# Patient Record
Sex: MALE
[De-identification: names, ages, dates, MRNs, and addresses within clinical notes are randomized per-mention and may not be internally consistent; named-entity substitution may affect disease eponyms.]

---

## 2018-03-22 ENCOUNTER — HOSPITAL (OUTPATIENT)
Dept: OTHER | Age: 73
End: 2018-03-22
Attending: UROLOGY

## 2018-03-22 LAB — SURG SPECIMEN: NORMAL

## 2018-08-02 PROBLEM — F41.9 ANXIETY: Status: ACTIVE | Noted: 2018-08-02

## 2018-08-02 PROBLEM — M19.90 OSTEOARTHRITIS, UNSPECIFIED OSTEOARTHRITIS TYPE, UNSPECIFIED SITE: Status: ACTIVE | Noted: 2018-08-02

## 2018-08-02 PROCEDURE — 86803 HEPATITIS C AB TEST: CPT | Performed by: INTERNAL MEDICINE

## 2018-08-02 PROCEDURE — 36415 COLL VENOUS BLD VENIPUNCTURE: CPT | Performed by: INTERNAL MEDICINE

## 2019-02-08 PROBLEM — K42.9 UMBILICAL HERNIA WITHOUT OBSTRUCTION AND WITHOUT GANGRENE: Status: ACTIVE | Noted: 2019-02-08

## 2020-06-19 PROBLEM — E78.5 DYSLIPIDEMIA: Status: ACTIVE | Noted: 2020-06-19

## 2020-11-11 ENCOUNTER — HOSPITAL ENCOUNTER (OUTPATIENT)
Age: 75
Discharge: HOME | End: 2020-11-11
Payer: MEDICARE

## 2020-11-11 VITALS
SYSTOLIC BLOOD PRESSURE: 155 MMHG | DIASTOLIC BLOOD PRESSURE: 72 MMHG | TEMPERATURE: 97 F | OXYGEN SATURATION: 98 % | HEART RATE: 64 BPM | RESPIRATION RATE: 20 BRPM

## 2020-11-11 DIAGNOSIS — Z20.822 CLOSE EXPOSURE TO COVID-19 VIRUS: Primary | ICD-10-CM

## 2020-11-11 DIAGNOSIS — J34.89 RHINORRHEA: ICD-10-CM

## 2020-11-11 PROCEDURE — 99213 OFFICE O/P EST LOW 20 MIN: CPT

## 2020-11-11 PROCEDURE — 99203 OFFICE O/P NEW LOW 30 MIN: CPT

## 2020-11-11 NOTE — ED PROVIDER NOTES
Patient Seen in: [facility]      History   Patient presents with:  Testing  Runny Nose    Stated Complaint: [phone] test exposed,cough    HPI    Ezequiel Acosta is a 51-year-old male who presents today with concern for possible COVID 19.   Pas °C) (Temporal)   Resp 20   SpO2 98%         Physical Exam    Nursing note reviewed. Vital signs reviewed. Constitutional: Oriented to person, place, and time. Patient appears well-developed and well-nourished, non-toxic and in no acute distress.  Activel addressed to the patient's satisfaction prior to discharge today.          Disposition and Plan     Clinical Impression:  Close exposure to COVID-19 virus  (primary encounter diagnosis)  Rhinorrhea    Disposition:  Discharge  11/11/2020  3:34 pm    Follow-u

## 2021-03-16 PROBLEM — M47.816 LUMBAR SPONDYLOSIS: Status: ACTIVE | Noted: 2021-03-16

## 2021-03-16 PROBLEM — M43.16 SPONDYLOLISTHESIS AT L4-L5 LEVEL: Status: ACTIVE | Noted: 2021-03-16

## 2021-04-12 DIAGNOSIS — Z23 NEED FOR VACCINATION: ICD-10-CM

## 2025-04-30 ENCOUNTER — LAB REQUISITION (OUTPATIENT)
Dept: LAB | Facility: HOSPITAL | Age: 80
End: 2025-04-30
Payer: MEDICARE

## 2025-04-30 DIAGNOSIS — N17.9 ACUTE KIDNEY FAILURE, UNSPECIFIED: ICD-10-CM

## 2025-04-30 DIAGNOSIS — D62 ACUTE POSTHEMORRHAGIC ANEMIA: ICD-10-CM

## 2025-04-30 LAB
ALBUMIN SERPL-MCNC: 3.3 G/DL (ref 3.2–4.8)
ALBUMIN/GLOB SERPL: 1.7 {RATIO} (ref 1–2)
ALP LIVER SERPL-CCNC: 152 U/L (ref 45–117)
ALT SERPL-CCNC: 10 U/L (ref 10–49)
ANION GAP SERPL CALC-SCNC: 6 MMOL/L (ref 0–18)
AST SERPL-CCNC: 24 U/L (ref ?–34)
BASOPHILS # BLD AUTO: 0.02 X10(3) UL (ref 0–0.2)
BASOPHILS NFR BLD AUTO: 0.2 %
BILIRUB SERPL-MCNC: 2.4 MG/DL (ref 0.2–1.1)
BUN BLD-MCNC: 13 MG/DL (ref 9–23)
CALCIUM BLD-MCNC: 8.5 MG/DL (ref 8.7–10.6)
CHLORIDE SERPL-SCNC: 108 MMOL/L (ref 98–112)
CO2 SERPL-SCNC: 27 MMOL/L (ref 21–32)
CREAT BLD-MCNC: 0.92 MG/DL (ref 0.7–1.3)
DEPRECATED HBV CORE AB SER IA-ACNC: 298 NG/ML (ref 50–336)
EGFRCR SERPLBLD CKD-EPI 2021: 84 ML/MIN/1.73M2 (ref 60–?)
EOSINOPHIL # BLD AUTO: 0.06 X10(3) UL (ref 0–0.7)
EOSINOPHIL NFR BLD AUTO: 0.7 %
ERYTHROCYTE [DISTWIDTH] IN BLOOD BY AUTOMATED COUNT: 17.6 %
GLOBULIN PLAS-MCNC: 2 G/DL (ref 2–3.5)
GLUCOSE BLD-MCNC: 93 MG/DL (ref 70–99)
HCT VFR BLD AUTO: 27.2 % (ref 39–53)
HGB BLD-MCNC: 9 G/DL (ref 13–17.5)
IMM GRANULOCYTES # BLD AUTO: 0.04 X10(3) UL (ref 0–1)
IMM GRANULOCYTES NFR BLD: 0.5 %
IRON SATN MFR SERPL: 20 % (ref 20–50)
IRON SERPL-MCNC: 39 UG/DL (ref 65–175)
LYMPHOCYTES # BLD AUTO: 1.09 X10(3) UL (ref 1–4)
LYMPHOCYTES NFR BLD AUTO: 13.1 %
MCH RBC QN AUTO: 31.6 PG (ref 26–34)
MCHC RBC AUTO-ENTMCNC: 33.1 G/DL (ref 31–37)
MCV RBC AUTO: 95.4 FL (ref 80–100)
MONOCYTES # BLD AUTO: 0.59 X10(3) UL (ref 0.1–1)
MONOCYTES NFR BLD AUTO: 7.1 %
NEUTROPHILS # BLD AUTO: 6.49 X10 (3) UL (ref 1.5–7.7)
NEUTROPHILS # BLD AUTO: 6.49 X10(3) UL (ref 1.5–7.7)
NEUTROPHILS NFR BLD AUTO: 78.4 %
OSMOLALITY SERPL CALC.SUM OF ELEC: 292 MOSM/KG (ref 275–295)
PLATELET # BLD AUTO: 463 10(3)UL (ref 150–450)
POTASSIUM SERPL-SCNC: 3.5 MMOL/L (ref 3.5–5.1)
PROT SERPL-MCNC: 5.3 G/DL (ref 5.7–8.2)
RBC # BLD AUTO: 2.85 X10(6)UL (ref 3.8–5.8)
SODIUM SERPL-SCNC: 141 MMOL/L (ref 136–145)
TOTAL IRON BINDING CAPACITY: 198 UG/DL (ref 250–425)
TRANSFERRIN SERPL-MCNC: 146 MG/DL (ref 215–365)
WBC # BLD AUTO: 8.3 X10(3) UL (ref 4–11)

## 2025-04-30 PROCEDURE — 82728 ASSAY OF FERRITIN: CPT | Performed by: INTERNAL MEDICINE

## 2025-04-30 PROCEDURE — 83540 ASSAY OF IRON: CPT | Performed by: INTERNAL MEDICINE

## 2025-04-30 PROCEDURE — 85025 COMPLETE CBC W/AUTO DIFF WBC: CPT

## 2025-04-30 PROCEDURE — 83550 IRON BINDING TEST: CPT | Performed by: INTERNAL MEDICINE

## 2025-04-30 PROCEDURE — 83550 IRON BINDING TEST: CPT

## 2025-04-30 PROCEDURE — 83540 ASSAY OF IRON: CPT

## 2025-04-30 PROCEDURE — 80053 COMPREHEN METABOLIC PANEL: CPT

## 2025-04-30 PROCEDURE — 80053 COMPREHEN METABOLIC PANEL: CPT | Performed by: INTERNAL MEDICINE

## 2025-04-30 PROCEDURE — 85025 COMPLETE CBC W/AUTO DIFF WBC: CPT | Performed by: INTERNAL MEDICINE

## 2025-04-30 PROCEDURE — 82728 ASSAY OF FERRITIN: CPT

## 2025-05-01 ENCOUNTER — HOSPITAL ENCOUNTER (OUTPATIENT)
Facility: HOSPITAL | Age: 80
Setting detail: OBSERVATION
LOS: 1 days | Discharge: NURSING, REHAB OR LONG-TERM CARE | End: 2025-05-07
Attending: EMERGENCY MEDICINE | Admitting: HOSPITALIST
Payer: MEDICARE

## 2025-05-01 ENCOUNTER — APPOINTMENT (OUTPATIENT)
Dept: GENERAL RADIOLOGY | Facility: HOSPITAL | Age: 80
End: 2025-05-01
Attending: EMERGENCY MEDICINE
Payer: MEDICARE

## 2025-05-01 DIAGNOSIS — R29.6 FALLING: Primary | ICD-10-CM

## 2025-05-01 DIAGNOSIS — F03.C0 SEVERE DEMENTIA WITHOUT BEHAVIORAL DISTURBANCE, PSYCHOTIC DISTURBANCE, MOOD DISTURBANCE, OR ANXIETY, UNSPECIFIED DEMENTIA TYPE (HCC): ICD-10-CM

## 2025-05-01 DIAGNOSIS — S42.401A CLOSED FRACTURE OF RIGHT ELBOW, INITIAL ENCOUNTER: ICD-10-CM

## 2025-05-01 LAB
ALBUMIN SERPL-MCNC: 3.8 G/DL (ref 3.2–4.8)
ALBUMIN/GLOB SERPL: 1.5 {RATIO} (ref 1–2)
ALP LIVER SERPL-CCNC: 194 U/L (ref 45–117)
ALT SERPL-CCNC: 10 U/L (ref 10–49)
ANION GAP SERPL CALC-SCNC: 9 MMOL/L (ref 0–18)
AST SERPL-CCNC: 27 U/L (ref ?–34)
BASOPHILS # BLD AUTO: 0.01 X10(3) UL (ref 0–0.2)
BASOPHILS NFR BLD AUTO: 0.1 %
BILIRUB SERPL-MCNC: 2.9 MG/DL (ref 0.2–1.1)
BUN BLD-MCNC: 13 MG/DL (ref 9–23)
CALCIUM BLD-MCNC: 9.3 MG/DL (ref 8.7–10.6)
CHLORIDE SERPL-SCNC: 106 MMOL/L (ref 98–112)
CO2 SERPL-SCNC: 24 MMOL/L (ref 21–32)
CREAT BLD-MCNC: 0.85 MG/DL (ref 0.7–1.3)
EGFRCR SERPLBLD CKD-EPI 2021: 88 ML/MIN/1.73M2 (ref 60–?)
EOSINOPHIL # BLD AUTO: 0.12 X10(3) UL (ref 0–0.7)
EOSINOPHIL NFR BLD AUTO: 1.2 %
ERYTHROCYTE [DISTWIDTH] IN BLOOD BY AUTOMATED COUNT: 17.4 %
GLOBULIN PLAS-MCNC: 2.5 G/DL (ref 2–3.5)
GLUCOSE BLD-MCNC: 94 MG/DL (ref 70–99)
HCT VFR BLD AUTO: 29.6 % (ref 39–53)
HGB BLD-MCNC: 9.7 G/DL (ref 13–17.5)
IMM GRANULOCYTES # BLD AUTO: 0.06 X10(3) UL (ref 0–1)
IMM GRANULOCYTES NFR BLD: 0.6 %
LYMPHOCYTES # BLD AUTO: 1.44 X10(3) UL (ref 1–4)
LYMPHOCYTES NFR BLD AUTO: 14.3 %
MCH RBC QN AUTO: 31.2 PG (ref 26–34)
MCHC RBC AUTO-ENTMCNC: 32.8 G/DL (ref 31–37)
MCV RBC AUTO: 95.2 FL (ref 80–100)
MONOCYTES # BLD AUTO: 0.83 X10(3) UL (ref 0.1–1)
MONOCYTES NFR BLD AUTO: 8.2 %
NEUTROPHILS # BLD AUTO: 7.62 X10 (3) UL (ref 1.5–7.7)
NEUTROPHILS # BLD AUTO: 7.62 X10(3) UL (ref 1.5–7.7)
NEUTROPHILS NFR BLD AUTO: 75.6 %
OSMOLALITY SERPL CALC.SUM OF ELEC: 288 MOSM/KG (ref 275–295)
PLATELET # BLD AUTO: 472 10(3)UL (ref 150–450)
POTASSIUM SERPL-SCNC: 3.3 MMOL/L (ref 3.5–5.1)
PROT SERPL-MCNC: 6.3 G/DL (ref 5.7–8.2)
RBC # BLD AUTO: 3.11 X10(6)UL (ref 3.8–5.8)
SODIUM SERPL-SCNC: 139 MMOL/L (ref 136–145)
WBC # BLD AUTO: 10.1 X10(3) UL (ref 4–11)

## 2025-05-01 PROCEDURE — 71045 X-RAY EXAM CHEST 1 VIEW: CPT | Performed by: EMERGENCY MEDICINE

## 2025-05-01 PROCEDURE — 73070 X-RAY EXAM OF ELBOW: CPT | Performed by: EMERGENCY MEDICINE

## 2025-05-01 PROCEDURE — 72190 X-RAY EXAM OF PELVIS: CPT | Performed by: EMERGENCY MEDICINE

## 2025-05-01 RX ORDER — ACETAMINOPHEN 500 MG
500 TABLET ORAL EVERY 4 HOURS PRN
Status: DISCONTINUED | OUTPATIENT
Start: 2025-05-01 | End: 2025-05-07

## 2025-05-01 RX ORDER — MELOXICAM 15 MG/1
15 TABLET ORAL DAILY
COMMUNITY
Start: 2024-07-02 | End: 2025-05-07

## 2025-05-01 RX ORDER — HYDROCODONE BITARTRATE AND ACETAMINOPHEN 5; 325 MG/1; MG/1
1 TABLET ORAL EVERY 4 HOURS PRN
COMMUNITY
Start: 2025-04-30

## 2025-05-01 RX ORDER — FOLIC ACID 1 MG/1
1 TABLET ORAL DAILY
COMMUNITY

## 2025-05-01 RX ORDER — LORAZEPAM 2 MG/ML
0.5 INJECTION INTRAMUSCULAR EVERY 4 HOURS PRN
Status: DISCONTINUED | OUTPATIENT
Start: 2025-05-01 | End: 2025-05-07

## 2025-05-01 RX ORDER — ASPIRIN 81 MG/1
81 TABLET, CHEWABLE ORAL 2 TIMES DAILY
COMMUNITY
Start: 2025-04-22 | End: 2025-05-13

## 2025-05-01 RX ORDER — DONEPEZIL HYDROCHLORIDE 10 MG/1
10 TABLET, FILM COATED ORAL NIGHTLY
Status: DISCONTINUED | OUTPATIENT
Start: 2025-05-01 | End: 2025-05-05

## 2025-05-01 RX ORDER — HYDROCODONE BITARTRATE AND ACETAMINOPHEN 5; 325 MG/1; MG/1
1 TABLET ORAL ONCE
Refills: 0 | Status: COMPLETED | OUTPATIENT
Start: 2025-05-01 | End: 2025-05-01

## 2025-05-01 RX ORDER — LORAZEPAM 2 MG/ML
0.5 INJECTION INTRAMUSCULAR ONCE
Status: COMPLETED | OUTPATIENT
Start: 2025-05-01 | End: 2025-05-01

## 2025-05-01 RX ORDER — HEPARIN SODIUM 5000 [USP'U]/ML
5000 INJECTION, SOLUTION INTRAVENOUS; SUBCUTANEOUS EVERY 8 HOURS SCHEDULED
Status: DISCONTINUED | OUTPATIENT
Start: 2025-05-01 | End: 2025-05-02

## 2025-05-01 RX ORDER — MELATONIN
100 DAILY
Status: DISCONTINUED | OUTPATIENT
Start: 2025-05-02 | End: 2025-05-04

## 2025-05-01 RX ORDER — ATORVASTATIN CALCIUM 10 MG/1
10 TABLET, FILM COATED ORAL NIGHTLY
Status: DISCONTINUED | OUTPATIENT
Start: 2025-05-02 | End: 2025-05-07

## 2025-05-01 RX ORDER — HYDROCODONE BITARTRATE AND ACETAMINOPHEN 5; 325 MG/1; MG/1
1 TABLET ORAL EVERY 6 HOURS PRN
Status: DISCONTINUED | OUTPATIENT
Start: 2025-05-01 | End: 2025-05-03

## 2025-05-01 RX ORDER — DONEPEZIL HYDROCHLORIDE 5 MG/1
5 TABLET, FILM COATED ORAL NIGHTLY
Status: DISCONTINUED | OUTPATIENT
Start: 2025-05-01 | End: 2025-05-01

## 2025-05-01 RX ORDER — DONEPEZIL HYDROCHLORIDE 10 MG/1
10 TABLET, FILM COATED ORAL NIGHTLY
COMMUNITY
Start: 2025-03-03 | End: 2025-05-07

## 2025-05-01 RX ORDER — ONDANSETRON 2 MG/ML
4 INJECTION INTRAMUSCULAR; INTRAVENOUS EVERY 4 HOURS PRN
Status: ACTIVE | OUTPATIENT
Start: 2025-05-01 | End: 2025-05-01

## 2025-05-01 RX ORDER — SODIUM CHLORIDE 9 MG/ML
INJECTION, SOLUTION INTRAVENOUS ONCE
Status: COMPLETED | OUTPATIENT
Start: 2025-05-01 | End: 2025-05-03

## 2025-05-01 RX ORDER — ACETAMINOPHEN 500 MG
500 TABLET ORAL ONCE
Status: COMPLETED | OUTPATIENT
Start: 2025-05-01 | End: 2025-05-01

## 2025-05-01 RX ORDER — HYDROMORPHONE HYDROCHLORIDE 1 MG/ML
0.5 INJECTION, SOLUTION INTRAMUSCULAR; INTRAVENOUS; SUBCUTANEOUS EVERY 30 MIN PRN
Status: ACTIVE | OUTPATIENT
Start: 2025-05-01 | End: 2025-05-01

## 2025-05-01 NOTE — ED PROVIDER NOTES
Patient Seen in: [facility] Emergency Department      History     Chief Complaint   Patient presents with    Fall     Stated Complaint: Multiple falls, last fall 2 days ago    Subjective:     HPI    80-year-old male who has hypertension and dementia and experienced a fall on 2025, while visiting his cousin in [place] for a family event. Due to his dementia, he was unaware of the stairs in the hallway, leading to a significant fall. As a result, he sustained multiple injuries, including fractures in his pelvis, back, right elbow, and two ribs. He was hospitalized in [place], where he required two blood transfusions due to internal bleeding associated with the pelvic fracture. Despite these injuries, he attempted to walk. Two days ago, he fell again from a standing position, claiming the kitchen sink moved, but did not seek hospital care after this incident. Since the recent fall, he has been experiencing persistent pain, particularly in his lower back, and has been mostly bedridden, using a bedpan for bathroom needs. His family has been in contact with his doctor to arrange for home rehabilitation.    Objective:   Past Medical History:    Anxiety    Back problem    Dementia (HCC)    High blood pressure    Muscle weakness    Osteoarthritis              Past Surgical History:   Procedure Laterality Date    Cataract  ?    R eye    Colonoscopy  2018    colon polyps    Fracture surgery      Hip replacement surgery      Knee replacement surgery                  Social History     Socioeconomic History    Marital status:     Number of children: 3   Occupational History    Occupation: Retired  40 yrs   Tobacco Use    Smoking status: Former     Current packs/day: 0.00     Types: Cigarettes     Quit date: 2002     Years since quittin.7    Smokeless tobacco: Never   Vaping Use    Vaping status: Never Used   Substance and Sexual Activity    Alcohol use: Yes     Alcohol/week: 12.0  standard drinks of alcohol     Types: 12 Cans of beer per week     Comment: Social    Drug use: Yes     Types: Cannabis     Comment: daily    Sexual activity: Yes     Partners: Female   Other Topics Concern     Service No    Blood Transfusions No    Exercise Yes    Seat Belt Yes     Social Drivers of Health     Food Insecurity: No Food Insecurity (5/1/2025)    NCSS - Food Insecurity     Worried About Running Out of Food in the Last Year: No     Ran Out of Food in the Last Year: No   Transportation Needs: Unmet Transportation Needs (5/1/2025)    NCSS - Transportation     Lack of Transportation: Yes   Housing Stability: Not At Risk (5/1/2025)    NCSS - Housing/Utilities     Has Housing: Yes     Worried About Losing Housing: No     Unable to Get Utilities: No              Review of Systems    Positive for stated complaint: Multiple falls, last fall 2 days ago  Other systems are as noted in HPI.  Constitutional and vital signs reviewed.      All other systems reviewed and negative except as noted above.    Physical Exam     ED Triage Vitals [05/01/25 1528]   BP (!) 163/60   Pulse 74   Resp 16   Temp 98.4 °F (36.9 °C)   Temp src Oral   SpO2 97 %   O2 Device None (Room air)       Current:/74 (BP Location: Left arm)   Pulse 66   Temp 97.3 °F (36.3 °C) (Oral)   Resp 18   Ht 175.3 cm (5' 9\")   Wt 69.9 kg   SpO2 98%   BMI 22.76 kg/m²       General:  Vitals as listed.  No acute distress  Head: Atraumatic  HEENT: Sclerae anicteric.  Conjunctivae show no pallor.  Oropharynx clear, mucous membranes moist  Neck: Nontender  Lungs: good air exchange and clear   Heart: regular rate rhythm and no murmur  Back: Nontender  Chest wall: Mild right mid lateral rib tenderness  Abdomen: Soft and nontender.  No abdominal masses.  No peritoneal signs  Pelvis: Stable and nontender to rocking  Extremities: Right elbow splinted.  No significant swelling of the right forearm.  There is a bruise on the left proximal medial  forearm.  Neuro: Alert, chronically disoriented and nonfocal      ED Course     Labs Reviewed   COMP METABOLIC PANEL (14) - Abnormal; Notable for the following components:       Result Value    Potassium 3.3 (*)     Alkaline Phosphatase 194 (*)     Bilirubin, Total 2.9 (*)     All other components within normal limits   CBC WITH DIFFERENTIAL WITH PLATELET - Abnormal; Notable for the following components:    RBC 3.11 (*)     HGB 9.7 (*)     HCT 29.6 (*)     .0 (*)     All other components within normal limits   URINALYSIS WITH CULTURE REFLEX     XR ELBOW, (2 VIEWS), RIGHT (CPT=73070)  Result Date: 5/1/2025  CONCLUSION:  1. Postsurgical changes with plate and screw fixation of the proximal ulna. 2. There is an avulsion fracture fragment along the dorsal aspect of the distal humerus.  This may be chronic but correlation region of pain is recommended and if prior radiographs are available, comparison would be beneficial. 3. Joint effusion. 4. Osteoarthritic changes.    LOCATION:  [facility]   Dictated by (CST): Angelito Guido MD on 5/01/2025 at 7:23 PM     Finalized by (CST): Angelito Guido MD on 5/01/2025 at 7:26 PM       XR CHEST AP PORTABLE  (CPT=71045)  Result Date: 5/1/2025  CONCLUSION:  1. Mildly displaced posterior 7th and 8th rib fractures. 2. Mild interstitial opacities may represent chronic interstitial changes versus mild edema.    LOCATION:  [facility]      Dictated by (CST): Angelito Guido MD on 5/01/2025 at 7:20 PM     Finalized by (CST): Angelito Guido MD on 5/01/2025 at 7:21 PM       XR PELVIS (COMPLETE MIN 3 VIEWS) (CPT=72190)  Result Date: 5/1/2025  CONCLUSION:  There are multiple large orthopedic screws noted scattered in the pelvis.  There is age-indeterminate fracture deformities of the superior pubic rami adjacent to screws.  Comparison with prior exams would be helpful to assess for interval change.    LOCATION:  [id number]   Dictated by (CST): Ibrahima Starr MD on 5/01/2025 at 7:16 PM      Finalized by (CST): Ibrahima Starr MD on 5/01/2025 at 7:18 PM       ED COURSE and MDM     Sources of the medical history included the patient and multiple family members.    Differential diagnosis before testing included stable orthopedic ORIF versus disruption of his pelvis ORIF causing massive hemorrhage, a medical condition that poses a threat to life.    I reviewed prior external notes including records from [facility] as recorded below.     saw patient in the Emergency Department and recommends admission for SNF placement.  She will order OT/PT consultation.     Case discussed with the [facility] hospitalist.    It appears the patient has no new bony injuries.  This is based on the following information in Blue font :    Studies that patient’s granddaughter shared from virgilio from [facility]    XR Right Elbow: Acute, distracted and mildly comminuted olecranon process fracture.    CT PELVIS WO IV CONTRAST 4/18/2025 5:46 AM  Urinary bladder: Bladder wall thickening. Gas within bladder. Infection or bladder injury can not be excluded.  Bones/joints: Postoperative changes of the pelvis.  Status post percutaneous  fixation of the sacroiliac joints with 2 long screws traversing both SI joints.  IlluminOss device within the right ilium. Improved alignment of the right iliac  wing fracture. Decreased displacement of the right iliac wing fracture.  Nondisplaced left iliac wing fracture. Nondisplaced right sacral fracture. Mild widening of the right SI joint, however widening has improved compared to prior  exam. Left sacral ala fracture. Cortex is mildly depressed. Percutaneous  fixation of the bilateral superior rami fractures with 2 long screws transfixing the superior pubic rami fractures.  Improved alignment compared to  prior exam. Minimally displaced bilateral inferior pubic rami fractures,  alignment is unchanged. Bilateral hip arthroplasties. Associated artifact from  hardware.  Soft tissues: Diffuse soft tissue swelling and subcutaneous gas, presumably  postoperative. Evidence of hemorrhage within the pelvis bilaterally. Hemorrhage  along the iliopsoas muscles bilaterally, extending to the space of read CS.  There is also focal swelling of the gluteal musculature bilaterally, likely related to edema and hemorrhage.    Impression  Constellation of findings is compatible with prior lateral compression type  injury, status post percutaneous fixation of bilateral sacral fractures/ Sl  joints, and bilateral superior pubic rami fractures.  Right ilium IlluminOss device. Improved alignment of right SI joint with slight persistent widening  anteriorly. Improved alignment of the pelvic fractures. Pelvic fractures  involve the bilateral iliac wings, sacrum, bilateral superior inferior pubic rami.      XR Pelvis: 4/21/2025 8:30 PM  FINDINGS:  Bones/joints: Stable postoperative changes following fixation of sacral and  pelvic fractures. The hardware is stable from the postoperative CT scan of  04/18/2025. There are 2 screws spanning the sacroiliac joints. There are large  FINDINGS:  Bones/joints: Stable postoperative changes following fixation of sacral and  pelvic fractures. The hardware is stable from the postoperative CT scan of  04/18/2025. There are 2 screws spanning the sacroitiac joints. There are large  curve screws which extend from the lateral supra-acetabular regions of the iliac bones across the superior ischial/pubic rami, terminating near the pubic symphysis. The hardware appears to be in good position and stable. No significant deformity is seen following open reduction internal fixation. Nobnew fractures identified. There is an old healed right inferior pubic ramus fracture. There is marked osteopenia. Bilateral hip prostheses are partially visualized and appear to be in good position.  Soft tissues: Unremarkable.  Vasculature: Vascular calcifications are noted.    Patient  given Ativan for mild agitation while waiting for hospital bed.    I have discussed with the patient the results of testing, differential diagnosis, and treatment plan. They expressed clear understanding of these instructions and agrees to the plan provided.    Disposition and Plan     Clinical Impression:  1. Falling    2. Severe dementia without behavioral disturbance, psychotic disturbance, mood disturbance, or anxiety, unspecified dementia type (HCC)    3. Closed fracture of right elbow, initial encounter         Disposition:  Admit  5/1/2025  6:35 pm    Follow-up:  No follow-up provider specified.      Medications Prescribed:  Current Discharge Medication List

## 2025-05-01 NOTE — CM/SW NOTE
CM met with patient at [facility] and family in [facility] patient was in [place] had a fall and was hospitalized with many fractures. When patient returned home patient continues to have pain, weakness and another fall. Family requesting rehab to increase patient strength and endurance and return home with home health following rehab placement.    Patient lives with wife in a one level house. Wife states \"I cannot keep picking him up after all these falls.\"    Resources given including respite care, caregiver resources and a place for mom    Discussed with MD, jeffrey braswell for PT OT and will submit for insurance auth for rehab placement    Judy BAILEY, CCM, MSN    Emergency Room  [facility]  Clinical Transitions Leader  [phone]

## 2025-05-01 NOTE — ED INITIAL ASSESSMENT (HPI)
Presents to ED for mid lower back pain, increased agitation. Family endorses multiple falls the past couple days, two days ago had an unwitnessed fall unknown LOC, unknown head trauma. Recently had surgery on pelvis, right elbow on the 18th and 19th. Hx dementia. Takes daily baby aspirin.

## 2025-05-02 ENCOUNTER — APPOINTMENT (OUTPATIENT)
Dept: GENERAL RADIOLOGY | Facility: HOSPITAL | Age: 80
End: 2025-05-02
Attending: INTERNAL MEDICINE
Payer: MEDICARE

## 2025-05-02 LAB
ALBUMIN SERPL-MCNC: 3.5 G/DL (ref 3.2–4.8)
ALP LIVER SERPL-CCNC: 184 U/L (ref 45–117)
ALT SERPL-CCNC: 8 U/L (ref 10–49)
ANION GAP SERPL CALC-SCNC: 5 MMOL/L (ref 0–18)
AST SERPL-CCNC: 23 U/L (ref ?–34)
BILIRUB DIRECT SERPL-MCNC: 0.8 MG/DL (ref ?–0.3)
BILIRUB DIRECT SERPL-MCNC: 0.9 MG/DL (ref ?–0.3)
BILIRUB SERPL-MCNC: 2.6 MG/DL (ref 0.2–1.1)
BUN BLD-MCNC: 13 MG/DL (ref 9–23)
CALCIUM BLD-MCNC: 8.2 MG/DL (ref 8.7–10.6)
CHLORIDE SERPL-SCNC: 110 MMOL/L (ref 98–112)
CO2 SERPL-SCNC: 25 MMOL/L (ref 21–32)
CREAT BLD-MCNC: 0.84 MG/DL (ref 0.7–1.3)
EGFRCR SERPLBLD CKD-EPI 2021: 88 ML/MIN/1.73M2 (ref 60–?)
ERYTHROCYTE [DISTWIDTH] IN BLOOD BY AUTOMATED COUNT: 17.2 %
GLUCOSE BLD-MCNC: 98 MG/DL (ref 70–99)
HCT VFR BLD AUTO: 24.4 % (ref 39–53)
HGB BLD-MCNC: 8.1 G/DL (ref 13–17.5)
MAGNESIUM SERPL-MCNC: 1.9 MG/DL (ref 1.6–2.6)
MCH RBC QN AUTO: 31.2 PG (ref 26–34)
MCHC RBC AUTO-ENTMCNC: 33.2 G/DL (ref 31–37)
MCV RBC AUTO: 93.8 FL (ref 80–100)
OSMOLALITY SERPL CALC.SUM OF ELEC: 290 MOSM/KG (ref 275–295)
PLATELET # BLD AUTO: 412 10(3)UL (ref 150–450)
POTASSIUM SERPL-SCNC: 3.6 MMOL/L (ref 3.5–5.1)
POTASSIUM SERPL-SCNC: 3.6 MMOL/L (ref 3.5–5.1)
POTASSIUM SERPL-SCNC: 4.9 MMOL/L (ref 3.5–5.1)
PROT SERPL-MCNC: 5.7 G/DL (ref 5.7–8.2)
RBC # BLD AUTO: 2.6 X10(6)UL (ref 3.8–5.8)
SODIUM SERPL-SCNC: 140 MMOL/L (ref 136–145)
WBC # BLD AUTO: 6.1 X10(3) UL (ref 4–11)

## 2025-05-02 PROCEDURE — 72100 X-RAY EXAM L-S SPINE 2/3 VWS: CPT | Performed by: INTERNAL MEDICINE

## 2025-05-02 RX ORDER — FOLIC ACID 1 MG/1
1 TABLET ORAL DAILY
Status: DISCONTINUED | OUTPATIENT
Start: 2025-05-02 | End: 2025-05-07

## 2025-05-02 RX ORDER — TROLAMINE SALICYLATE 10 G/100G
CREAM TOPICAL 3 TIMES DAILY PRN
Status: DISCONTINUED | OUTPATIENT
Start: 2025-05-02 | End: 2025-05-07

## 2025-05-02 RX ORDER — ASPIRIN 81 MG/1
81 TABLET, CHEWABLE ORAL DAILY
Status: DISCONTINUED | OUTPATIENT
Start: 2025-05-03 | End: 2025-05-02

## 2025-05-02 RX ORDER — POTASSIUM CHLORIDE 1.5 G/1.58G
40 POWDER, FOR SOLUTION ORAL EVERY 4 HOURS
Status: COMPLETED | OUTPATIENT
Start: 2025-05-02 | End: 2025-05-02

## 2025-05-02 RX ORDER — LOSARTAN POTASSIUM 25 MG/1
25 TABLET ORAL DAILY
Status: DISCONTINUED | OUTPATIENT
Start: 2025-05-02 | End: 2025-05-07

## 2025-05-02 NOTE — ED QUICK NOTES
Orders for admission, patient is aware of plan and ready to go upstairs. Any questions, please call ED RN Jose Manuel at extension [phone].     Patient Covid vaccination status: Fully vaccinated     COVID Test Ordered in ED: None    COVID Suspicion at Admission: N/A    Running Infusions: Medication Infusions[1] None    Mental Status/LOC at time of transport: A&O x 4    Other pertinent information:   CIWA score: N/A   NIH score:  N/A             [1]

## 2025-05-02 NOTE — CM/SW NOTE
This is a Code 44. Patient failed inpatient criteria. Second level of review completed and supports observation.  UR committee in agreement. Discussed with Dr. Lewis by UR RN Jennifer who approves observation status.  Observation order noted. CURRY given to the patient and signed copy placed in their chart.         Lori SHEIKH RN, 05/02/25, 1:06 PM

## 2025-05-02 NOTE — PLAN OF CARE
NURSING ADMISSION NOTE      Patient admitted via Cart  Oriented to room.  Safety precautions initiated.  Bed in low position.  Call light in reach.    Assumed care at 2100  AVS completed by this RN  Pt drowsy/asleep, able to follow simple commands when awake, A&Ox 1-2  Family at bedside  Room air  NSR on tele  Regular diet  Pills whole with water  Cardiac electrolyte replacement- replaced K  Incontinent at times  Denies pain  Max assist- recent fall, bilateral hip surgery  Pt and family updated on plan of care, call light within reach, bed alarm on, bed lowered and locked, needs met at this time, will continue with plan of care      Problem: NEUROLOGICAL - ADULT  Goal: Achieves stable or improved neurological status  Description: INTERVENTIONS- Assess for and report changes in neurological status- Initiate measures to prevent increased intracranial pressure- Maintain blood pressure and fluid volume within ordered parameters to optimize cerebral perfusion and minimize risk of hemorrhage- Monitor temperature, glucose, and sodium. Initiate appropriate interventions as ordered  Outcome: Progressing     Problem: PAIN - ADULT  Goal: Verbalizes/displays adequate comfort level or patient's stated pain goal  Description: INTERVENTIONS:- Encourage pt to monitor pain and request assistance- Assess pain using appropriate pain scale- Administer analgesics based on type and severity of pain and evaluate response- Implement non-pharmacological measures as appropriate and evaluate response- Consider cultural and social influences on pain and pain management- Manage/alleviate anxiety- Utilize distraction and/or relaxation techniques- Monitor for opioid side effects- Notify MD/LIP if interventions unsuccessful or patient reports new pain- Anticipate increased pain with activity and pre-medicate as appropriate  Outcome: Progressing     Problem: SAFETY ADULT - FALL  Goal: Free from fall injury  Description: INTERVENTIONS:- Assess  pt frequently for physical needs- Identify cognitive and physical deficits and behaviors that affect risk of falls.- [place] fall precautions as indicated by assessment.- Educate pt/family on patient safety including physical limitations- Instruct pt to call for assistance with activity based on assessment- Modify environment to reduce risk of injury- Provide assistive devices as appropriate- Consider OT/PT consult to assist with strengthening/mobility- Encourage toileting schedule  Outcome: Progressing

## 2025-05-02 NOTE — PHYSICAL THERAPY NOTE
2 PT orders received, chart reviewed.  Per EMR, pt with recent. Bilateral pubic rami fractures, bilateral posterior medial iliac bone fractures, left sacral ala fracture, right SI joint diastases. Underwent percutaneous fixation of the right sacral and iliac fracture, left sacrum fracture, and a bilateral superior ramus fracture at OSH in [place].  Pt with fall down stairs while visiting family.  Pt with Xray ordered this date, will await results and any further recs for updated activity/weight bearing.  Noted pt to be WBAT of BLEs and NWB of RUE after initial injury/surgery.

## 2025-05-02 NOTE — CM/SW NOTE
05/02/25 1500   CM/SW Referral Data   Referral Source Nurse;Social Work (self-referral)   Reason for Referral Discharge planning  (SDOH)   Informant Spouse/Significant Other;EMR   Medical Hx   Does patient have an established PCP? Yes   Patient Info   Patient's Current Mental Status at Time of Assessment Alert   Patient's Home Environment House   Patient lives with Spouse/Significant other   Patient Status Prior to Admission   Independent with ADLs and Mobility No   Pt. requires assistance with Housework;Driving;Meals;Bathing;Ambulating;Dressing;Medications;Feeding;Toileting;Finances   Discharge Needs   Anticipated D/C needs To be determined   Choice of Post-Acute Provider   Informed patient of right to choose their preferred provider Yes     SW received order for SDOH. Pt is a 79 y/o male admitted with falling. Per EDCM note, family is requesting rehab. PT eval pending. SW met with pt and pt's spouse at bedside to discuss discharge planning.     Pt's spouse stated she is the main caregiver for pt. Pt's spouse stated pt needs assistance with ADLs. Pt's spouse stated pt has a walker and wheelchair, and uses the wheelchair more due to the falls.   Discussed therapy will work with pt to determine appropriate level of care for pt at discharge. SW informed pt's spouse insurance authorization will be needed prior to discharge. SW informed pt's spouse if therapy does not anticipate pt would benefit from gradual rehabilitative therapy, it is unlikely insurance will approve rehab. Pt's spouse requested [facility] services if pt does not qualify for rehab. SW informed pt's spouse SW will f/u with her regarding discharge plan.     Referral for rehab sent in AIDIN for potential rehab need at discharge. SW will need to attach therapy notes to referral once available.   HHC referral will need to be sent if pt does not have need for rehab. PASRR and insurance authorization needed if pt discharges to rehab.    Transportation resources  placed on AVS. SW will continue to follow.     RENETTA Cain  Discharge Planner

## 2025-05-02 NOTE — DIETARY NOTE
[facility]   part of [facility]    NUTRITION ASSESSMENT    Unable to diagnose malnutrition criteria at this time.    NUTRITION INTERVENTION:    RD nutrition Care Plan- See RD nutrition assessment for additional recommendations  Meal and Snacks - Monitor and encourage adequate PO intake.   Medical Food Supplements - Ensure Plus High Protein BID. Rationale/use for oral supplements discussed.    PATIENT STATUS: 05/02/25 80 year old male admitted with fall/ agitation. A/O 1-2. Pt sleeping upon visit this am. RD screened due to MST. Unable to assess wt loss. RD adding ONS BID to max intakes. Follow for po, ons, wts.     ANTHROPOMETRICS:  Ht: 175.3 cm (5' 9\")  Wt: 69.9 kg (154 lb 1.6 oz).   BMI: Body mass index is 22.76 kg/m².  IBW: 73 kg      WEIGHT HISTORY:   Wt Readings from Last 10 Encounters:   05/01/25 69.9 kg (154 lb 1.6 oz)   01/11/22 78 kg (172 lb)   12/28/21 75.3 kg (166 lb)   12/21/21 75.3 kg (166 lb)   11/29/21 75.3 kg (166 lb)   10/04/21 77.1 kg (170 lb)   09/24/21 76.7 kg (169 lb)   04/10/21 76.7 kg (169 lb)   03/26/21 74.4 kg (164 lb)   06/19/20 74.4 kg (164 lb)      NUTRITION:  Diet:       Procedures    Regular/General diet Is Patient on Accuchecks? No      Food Allergies: No  Cultural/Ethnic/Mandaeism Preferences Addressed: Yes    Percent Meals Eaten (last 3 days)       None          GI system review: WNL Last BM Date:  (CHELSEY)  Skin and wounds: WNL    NUTRITION RELATED PHYSICAL FINDINGS:     1. Body Fat/Muscle Mass: mild depletion body fat Buccal fat pad and Midaxillary line and mild muscle depletion Temple region     2. Fluid Accumulation: none    NUTRITION PRESCRIPTION:  70 kg Actual Body Weight  Calories: 4308-7781 calories/day (25-30 kcal/kg)  Protein:  grams protein/day (1.2-1.5 grams protein per kg)  Fluid: ~1 ml/kcal or per MD discretion    NUTRITION DIAGNOSIS/PROBLEM:  Inadequate oral intake related to physiological causes as evidenced by documented/reported insufficient oral  intake    MONITOR AND EVALUATE/NUTRITION GOALS:  PO intake of 75% of meals TID - New  PO intake of 75% of oral nutrition supplement/s - New  Weight stable within 1 to 2 lbs during admission - New    MEDICATIONS:  Reviewed    LABS:  Reviewed    Pt is at Moderate nutrition risk    Charlene Whitney RD, LDN  Clinical Dietitian

## 2025-05-02 NOTE — PLAN OF CARE
Patient alert and oriented x4.  On RA.  NSR on yuliana.  HR 80s.  L shoulder pain.  PRN norco.  XR lumbar spine completed.  PT/OT to see.  Educated on IS use.  Resting comfortably in bed.  Wife at bedside.

## 2025-05-02 NOTE — DISCHARGE INSTRUCTIONS
-[employer]ide [employer] [place] - [phone] [email or web address]  -Ride [place] [phone] or [phone] [email or web address]  -First Transit [phone] [email or web address]    -[place] Ride Around [facility] [phone]  -[facility] Transit System [phone]  -[facility] Transportation [phone] Ext. [phone]   -[facility] Dial-a-Ride Service  Reservations: [phone]  -[facility] [place] Shuttle Bus Line at [phone]  -[facility] Senior Bus Service [phone]  -[facility] [phone].  -[facility] Transit [phone]-[phone]  -[facility] [phone]   -[employer] Transportation [phone]  -[employer] Grandparent Transportation [phone] [email or web address]  -[employer] Transport [phone]  -Disabled on the Go [phone]  -[employer] Transfer [phone]  -[facility] Wheelchair Transport [phone]   -[employer]: [phone]  -[facility] mobile [facility] [phone]  -[employer] [phone]  -[employer] [facility] [place], [facility] [place], and [facility]. [phone] [email or web address]  -[employer] Transportation [facility] and surrounding communities [phone] [email or web address]

## 2025-05-02 NOTE — OCCUPATIONAL THERAPY NOTE
OT order received, chart reviewed. Patient admitted s/p recent hospitalization in [place] after a fall down a flight of stairs while visiting family. Patient then had another recent fall and was brought to ER .  Per EMR, pt with recent. Bilateral pubic rami fractures, bilateral posterior medial iliac bone fractures, left sacral ala fracture, right SI joint diastases. Underwent percutaneous fixation of the right sacral and iliac fracture, left sacrum fracture, and a bilateral superior ramus fracture. Patient with Xray ordered this date, will await results and any further recs for updated activity/weight bearing.  Noted pt to be WBAT of BLEs and NWB of RUE after initial injury/surgery.   Will follow to initiate OT evaluation when appropriate.

## 2025-05-02 NOTE — H&P
[facility]   part of [facility]    History & Physical    Greg Majano Jr. Patient Status:  Inpatient    1945 MRN [id number]   Location [facility] 3NE-A Attending Beck Lewis,    Hosp Day # 1 PCP Les Bella MD     Date:  2025  Date of Admission:  2025    Chief Complaint:     Chief Complaint   Patient presents with    Fall       HPI:   Greg Majano Jr. is a(n) 80 year old male Dementia, eHTN, HLD who fell down the stairs in [place], was admitted to OS, found to have right olecranon fracture, right rib fractures, bilateral unstable pelvic fractures w/ extraperitoneal hemorrhage, compression fractures at T4, T6 thought to be chronic, was seen by trauma and ortho, had ORIF of his right olecranon and pelvis. He had delirium, and required 1u pRBCs, he was taken back to his home in [place] by family who now presents w/  falls, inability to ambulate.     Patient w/ wife at bedside. He reports he is feeling with his hands. Denies pain, when laying in bed, but reports he hasn't attempted ambulation yet. His wife is able to provide more history as he cannot recall any details of his falls. She reports that he fell down stairs at his family's house in [place]. Since coming back home he has been fairly immobile, and po intake has been down, he then will get up to get out of bed bc he is impulsive and didn't wait for help. So he went into the kitchen and fell there too. He has no headahces, or neck pains. He has reported midline low back pain to his wife but denies any now.     History   Past Medical History[1]  Past Surgical History[2]  Family History[3]  Social History:  Short Social Hx on File[4]    Allergies/Medications:   Allergies: Allergies[5]  Prescriptions Prior to Admission[6]    Review of Systems:   A comprehensive 12 point review of systems was otherwise negative, aside from what's stated above.    Physical Exam:   Vital Signs:  Blood pressure (!) 163/62, pulse 69,  temperature 98.5 °F (36.9 °C), temperature source Oral, resp. rate 18, height 5' 9\" (1.753 m), weight 154 lb 1.6 oz (69.9 kg), SpO2 96%.     General: No acute distress. Alert and oriented x 3.  HEENT: Moist mucous membranes. EOM-I. PERRL  Neck: No lymphadenopathy.  No JVD. No carotid bruits.  Respiratory: CTAB, no wheezing   Cardiovascular: RRR, no murmurs   Abdomen: Soft, nontender, nondistended.  Positive bowel sounds. No rebound tenderness  Neurologic: No focal neurological deficits.  Musculoskeletal: right arm is in a cast   Integument: No lesions. No erythema.  Psychiatric: Appropriate mood and affect.    Results:     Lab Results   Component Value Date    WBC 6.1 05/02/2025    HGB 8.1 (L) 05/02/2025    HCT 24.4 (L) 05/02/2025    .0 05/02/2025    CREATSERUM 0.84 05/02/2025    BUN 13 05/02/2025     05/02/2025    K 3.6 05/02/2025    K 3.6 05/02/2025     05/02/2025    CO2 25.0 05/02/2025    GLU 98 05/02/2025    CA 8.2 (L) 05/02/2025    ALB 3.8 05/01/2025    ALKPHO 194 (H) 05/01/2025    BILT 2.9 (H) 05/01/2025    TP 6.3 05/01/2025    AST 27 05/01/2025    ALT 10 05/01/2025    T4F 1.30 11/29/2021    TSH 3.340 11/29/2021    PSA 6.40 (H) 02/08/2019    ESRML 9 06/19/2020    CRP 0.10 06/19/2020    MG 1.9 05/02/2025    B12 287 11/29/2021            XR ELBOW, (2 VIEWS), RIGHT (CPT=73070)  Result Date: 5/1/2025  CONCLUSION:  1. Postsurgical changes with plate and screw fixation of the proximal ulna. 2. There is an avulsion fracture fragment along the dorsal aspect of the distal humerus.  This may be chronic but correlation region of pain is recommended and if prior radiographs are available, comparison would be beneficial. 3. Joint effusion. 4. Osteoarthritic changes.    LOCATION:  [facility]   Dictated by (CST): Angelito Guido MD on 5/01/2025 at 7:23 PM     Finalized by (CST): Angelito Guido MD on 5/01/2025 at 7:26 PM       XR CHEST AP PORTABLE  (CPT=71045)  Result Date: 5/1/2025  CONCLUSION:  1. Mildly  displaced posterior 7th and 8th rib fractures. 2. Mild interstitial opacities may represent chronic interstitial changes versus mild edema.    LOCATION:  [facility]      Dictated by (CST): Angelito Guido MD on 5/01/2025 at 7:20 PM     Finalized by (CST): Angelito Guido MD on 5/01/2025 at 7:21 PM       XR PELVIS (COMPLETE MIN 3 VIEWS) (CPT=72190)  Result Date: 5/1/2025  CONCLUSION:  There are multiple large orthopedic screws noted scattered in the pelvis.  There is age-indeterminate fracture deformities of the superior pubic rami adjacent to screws.  Comparison with prior exams would be helpful to assess for interval change.    LOCATION:  [id number]   Dictated by (CST): Ibrahima Starr MD on 5/01/2025 at 7:16 PM     Finalized by (CST): Ibrahima Starr MD on 5/01/2025 at 7:18 PM             Assessment/Plan:     Greg Majano Jr. is a(n) 80 year old male Dementia, eHTN, HLD who fell down the stairs in [place], was admitted to OSH, found to have right olecranon fracture, right rib fractures, bilateral unstable pelvic fractures w/ extraperitoneal hemorrhage, compression fractures at T4, T6 thought to be chronic, was seen by trauma and ortho, had ORIF of his right olecranon and pelvis. He had delirium, and required 1u pRBCs, he was taken back to his home in [place] by family who now presents w/  falls, inability to ambulate.     [facility] fall   S/p bilateral unstable pelvic fractures s/p ORIF at OSH in [place]  Right olecranon fracture s/p ORIF   H/o compression fractures at T4, T6  Low back pains  Right rib fractures   Pulmonary contusion  - patient w/ repeated falls, likely 2/2 dementia, physical deconditioning, impulsiveness, etc   - w/ reports of low back pain, will obtain plain films low back, he had MRI T and L spine at OSH, and had compression fractures, nsgy did not rec surgical intervention   - pain control w/ norco prn   - consult pt/ot   - op ortho follow up     Minimally elevated bilirubin  - check t bili   - trend      Dementia   - cont home therapies   - b1 daily   - folate     eHTN  HLD  - lipitor   - resume home losartan     Anemia 2/2 abla from fractures and extraperitoneal bleed  - improved from prior, now down trending   - trend cbc daily   - hold aspirin, heparin for now     DVT ppx: see above   Code Status: full    Dispo: pt/ot consult, will likely require DAMIEN vs AIR     Beck Lewis, DO  [facility] Hospitalist                 [1]   Past Medical History:   Anxiety    Back problem    Dementia (HCC)    High blood pressure    Muscle weakness    Osteoarthritis   [2]   Past Surgical History:  Procedure Laterality Date    Cataract  ?    R eye    Colonoscopy  2018    colon polyps    Fracture surgery      Hip replacement surgery      Knee replacement surgery     [3]   Family History  Problem Relation Age of Onset    Cancer Daughter     Cancer Sister     Cancer Niece     Cancer Niece/Nephew    [4]   Social History  Socioeconomic History    Marital status:     Number of children: 3   Occupational History    Occupation: Retired  40 yrs   Tobacco Use    Smoking status: Former     Current packs/day: 0.00     Types: Cigarettes     Quit date: 2002     Years since quittin.7    Smokeless tobacco: Never   Vaping Use    Vaping status: Never Used   Substance and Sexual Activity    Alcohol use: Yes     Alcohol/week: 12.0 standard drinks of alcohol     Types: 12 Cans of beer per week     Comment: Social    Drug use: Yes     Types: Cannabis     Comment: daily    Sexual activity: Yes     Partners: Female   Other Topics Concern     Service No    Blood Transfusions No    Exercise Yes    Seat Belt Yes     Social Drivers of Health     Food Insecurity: No Food Insecurity (2025)    NCSS - Food Insecurity     Worried About Running Out of Food in the Last Year: No     Ran Out of Food in the Last Year: No   Transportation Needs: Unmet Transportation Needs (2025)    NCSS - Transportation     Lack of  Transportation: Yes   Housing Stability: Not At Risk (5/1/2025)    NCSS - Housing/Utilities     Has Housing: Yes     Worried About Losing Housing: No     Unable to Get Utilities: No   [5] No Known Allergies  [6]   Medications Prior to Admission   Medication Sig    aspirin 81 MG Oral Chew Tab Chew 1 tablet (81 mg total) by mouth 2 (two) times daily.    HYDROcodone-acetaminophen 5-325 MG Oral Tab Take 1 tablet by mouth every 4 (four) hours as needed for Pain. FOR PAIN    Meloxicam 15 MG Oral Tab Take 1 tablet (15 mg total) by mouth daily.    folic acid 1 MG Oral Tab Take 1 tablet (1 mg total) by mouth in the morning.    donepezil 10 MG Oral Tab Take 1 tablet (10 mg total) by mouth nightly.    thiamine 100 MG Oral Tab Take 1 tablet (100 mg total) by mouth daily.    LOSARTAN 25 MG Oral Tab TAKE 1 TABLET BY MOUTH EVERY DAY    atorvastatin 10 MG Oral Tab Take 1 tablet (10 mg total) by mouth daily.    ALPRAZolam 0.5 MG Oral Tab Take 1 tablet (0.5 mg total) by mouth daily. (Patient taking differently: Take 1 tablet (0.5 mg total) by mouth nightly as needed for Anxiety.)    Sildenafil Citrate (VIAGRA) 50 MG Oral Tab One po 30 min to 60 minutes prior to intercourse

## 2025-05-02 NOTE — ED QUICK NOTES
Pt received from previous RN, vitals rechecked and recorded. Pain medication given per family and pt's request.

## 2025-05-03 ENCOUNTER — APPOINTMENT (OUTPATIENT)
Dept: GENERAL RADIOLOGY | Facility: HOSPITAL | Age: 80
End: 2025-05-03
Attending: INTERNAL MEDICINE
Payer: MEDICARE

## 2025-05-03 LAB
ALBUMIN SERPL-MCNC: 3.7 G/DL (ref 3.2–4.8)
ALP LIVER SERPL-CCNC: 233 U/L (ref 45–117)
ALT SERPL-CCNC: 8 U/L (ref 10–49)
ANION GAP SERPL CALC-SCNC: 10 MMOL/L (ref 0–18)
AST SERPL-CCNC: 24 U/L (ref ?–34)
ATRIAL RATE: 69 BPM
BASOPHILS # BLD AUTO: 0.01 X10(3) UL (ref 0–0.2)
BASOPHILS NFR BLD AUTO: 0.1 %
BILIRUB DIRECT SERPL-MCNC: 0.9 MG/DL (ref ?–0.3)
BILIRUB SERPL-MCNC: 2.6 MG/DL (ref 0.2–1.1)
BUN BLD-MCNC: 10 MG/DL (ref 9–23)
CALCIUM BLD-MCNC: 8.8 MG/DL (ref 8.7–10.6)
CHLORIDE SERPL-SCNC: 105 MMOL/L (ref 98–112)
CO2 SERPL-SCNC: 22 MMOL/L (ref 21–32)
CREAT BLD-MCNC: 0.81 MG/DL (ref 0.7–1.3)
EGFRCR SERPLBLD CKD-EPI 2021: 89 ML/MIN/1.73M2 (ref 60–?)
EOSINOPHIL # BLD AUTO: 0.04 X10(3) UL (ref 0–0.7)
EOSINOPHIL NFR BLD AUTO: 0.6 %
ERYTHROCYTE [DISTWIDTH] IN BLOOD BY AUTOMATED COUNT: 17 %
GLUCOSE BLD-MCNC: 83 MG/DL (ref 70–99)
HCT VFR BLD AUTO: 27.1 % (ref 39–53)
HGB BLD-MCNC: 9 G/DL (ref 13–17.5)
IMM GRANULOCYTES # BLD AUTO: 0.03 X10(3) UL (ref 0–1)
IMM GRANULOCYTES NFR BLD: 0.4 %
LYMPHOCYTES # BLD AUTO: 1.2 X10(3) UL (ref 1–4)
LYMPHOCYTES NFR BLD AUTO: 17.9 %
MCH RBC QN AUTO: 31.6 PG (ref 26–34)
MCHC RBC AUTO-ENTMCNC: 33.2 G/DL (ref 31–37)
MCV RBC AUTO: 95.1 FL (ref 80–100)
MONOCYTES # BLD AUTO: 0.71 X10(3) UL (ref 0.1–1)
MONOCYTES NFR BLD AUTO: 10.6 %
NEUTROPHILS # BLD AUTO: 4.71 X10 (3) UL (ref 1.5–7.7)
NEUTROPHILS # BLD AUTO: 4.71 X10(3) UL (ref 1.5–7.7)
NEUTROPHILS NFR BLD AUTO: 70.4 %
OSMOLALITY SERPL CALC.SUM OF ELEC: 282 MOSM/KG (ref 275–295)
P AXIS: 36 DEGREES
P-R INTERVAL: 130 MS
PLATELET # BLD AUTO: 445 10(3)UL (ref 150–450)
POTASSIUM SERPL-SCNC: 4.1 MMOL/L (ref 3.5–5.1)
PROT SERPL-MCNC: 6.2 G/DL (ref 5.7–8.2)
Q-T INTERVAL: 430 MS
QRS DURATION: 130 MS
QTC CALCULATION (BEZET): 460 MS
R AXIS: 8 DEGREES
RBC # BLD AUTO: 2.85 X10(6)UL (ref 3.8–5.8)
SODIUM SERPL-SCNC: 137 MMOL/L (ref 136–145)
T AXIS: 95 DEGREES
VENTRICULAR RATE: 69 BPM
WBC # BLD AUTO: 6.7 X10(3) UL (ref 4–11)

## 2025-05-03 PROCEDURE — 73030 X-RAY EXAM OF SHOULDER: CPT | Performed by: INTERNAL MEDICINE

## 2025-05-03 RX ORDER — HYDROCODONE BITARTRATE AND ACETAMINOPHEN 5; 325 MG/1; MG/1
1-2 TABLET ORAL EVERY 6 HOURS PRN
Refills: 0 | Status: DISCONTINUED | OUTPATIENT
Start: 2025-05-03 | End: 2025-05-03

## 2025-05-03 RX ORDER — HYDROCODONE BITARTRATE AND ACETAMINOPHEN 5; 325 MG/1; MG/1
2 TABLET ORAL EVERY 4 HOURS PRN
Refills: 0 | Status: DISCONTINUED | OUTPATIENT
Start: 2025-05-03 | End: 2025-05-07

## 2025-05-03 RX ORDER — HYDROCODONE BITARTRATE AND ACETAMINOPHEN 5; 325 MG/1; MG/1
1 TABLET ORAL EVERY 4 HOURS PRN
Refills: 0 | Status: DISCONTINUED | OUTPATIENT
Start: 2025-05-03 | End: 2025-05-07

## 2025-05-03 RX ORDER — QUETIAPINE FUMARATE 25 MG/1
25 TABLET, FILM COATED ORAL NIGHTLY
Status: DISCONTINUED | OUTPATIENT
Start: 2025-05-03 | End: 2025-05-07

## 2025-05-03 RX ORDER — SODIUM CHLORIDE, SODIUM LACTATE, POTASSIUM CHLORIDE, CALCIUM CHLORIDE 600; 310; 30; 20 MG/100ML; MG/100ML; MG/100ML; MG/100ML
INJECTION, SOLUTION INTRAVENOUS CONTINUOUS
Status: DISCONTINUED | OUTPATIENT
Start: 2025-05-03 | End: 2025-05-04

## 2025-05-03 RX ORDER — ENOXAPARIN SODIUM 100 MG/ML
40 INJECTION SUBCUTANEOUS NIGHTLY
Status: DISCONTINUED | OUTPATIENT
Start: 2025-05-03 | End: 2025-05-07

## 2025-05-03 NOTE — PROGRESS NOTES
[facility] Hospitalist Progress Note     CC: Hospital Follow up - falls    PCP: Les Bella MD       Subjective:     Agitated delirium overnight, wanted to go home, was in pain from his arm, re direction failed, so he was restrained and given ativan   More calm this am but ao to self   Had issues w /this at osh and tx w/ seroquel     OBJECTIVE:    Blood pressure 151/73, pulse 59, temperature 97.9 °F (36.6 °C), temperature source Oral, resp. rate 18, height 5' 9\" (1.753 m), weight 154 lb 1.6 oz (69.9 kg), SpO2 96%.    Temp:  [97.5 °F (36.4 °C)-98.2 °F (36.8 °C)] 97.9 °F (36.6 °C)  Pulse:  [59-82] 59  Resp:  [17-18] 18  BP: (151-168)/(63-73) 151/73  SpO2:  [96 %-98 %] 96 %      Intake/Output:  No intake or output data in the 24 hours ending 05/03/25 1002    Last 3 Weights   05/01/25 2112 154 lb 1.6 oz (69.9 kg)   05/01/25 2107 154 lb (69.9 kg)   05/01/25 1528 145 lb (65.8 kg)   01/11/22 1152 172 lb (78 kg)   12/28/21 1144 166 lb (75.3 kg)       Exam   Gen: Alert, no acute distress  Heent: Normocephalic, atraumatic, neck supple, EOMI, PERRLA  Pulm: Lungs CTAB, normal respiratory effort  CV:  Regular rate and rhythm, no murmurs/rubs/gallops  Abd: Soft, nontender, nondistended, bowel sounds present  Extremities: No peripheral edema, no clubbing, pulses intact   Skin: No rashes or lesions  Neuro: Aox1, right arm in sling, moving all 4   Psych: appropriate mood and affect      Data Review:       Labs:     Recent Labs   Lab 04/30/25  1322 05/01/25  1842 05/02/25  0647   RBC 2.85* 3.11* 2.60*   HGB 9.0* 9.7* 8.1*   HCT 27.2* 29.6* 24.4*   MCV 95.4 95.2 93.8   MCH 31.6 31.2 31.2   MCHC 33.1 32.8 33.2   RDW 17.6 17.4 17.2   NEPRELIM 6.49 7.62  --    WBC 8.3 10.1 6.1   .0* 472.0* 412.0         Recent Labs   Lab 04/30/25  1322 05/01/25  1842 05/02/25  0647 05/02/25  1604   GLU 93 94 98  --    BUN 13 13 13  --    CREATSERUM 0.92 0.85 0.84  --    EGFRCR 84 88 88  --    CA 8.5* 9.3 8.2*  --     139  140  --    K 3.5 3.3* 3.6  3.6 4.9    106 110  --    CO2 27.0 24.0 25.0  --        Recent Labs   Lab 04/30/25  1322 05/01/25  1842 05/02/25  0844   ALT 10 10 8*   AST 24 27 23   ALB 3.3 3.8 3.5         Imaging:  XR LUMBAR SPINE (MIN 2 VIEWS) (CPT=72100)  Result Date: 5/2/2025  CONCLUSION:   1.  Grade 1 anterolisthesis of L4 on L5 secondary to moderate facet joint degenerative change.   2. Mild degenerative disc disease at L5-S1.   LOCATION:  [facility]   Dictated by (CST): Carlos Collins MD on 5/02/2025 at 10:36 AM     Finalized by (CST): Carlos Collins MD on 5/02/2025 at 10:37 AM       XR ELBOW, (2 VIEWS), RIGHT (CPT=73070)  Result Date: 5/1/2025  CONCLUSION:  1. Postsurgical changes with plate and screw fixation of the proximal ulna. 2. There is an avulsion fracture fragment along the dorsal aspect of the distal humerus.  This may be chronic but correlation region of pain is recommended and if prior radiographs are available, comparison would be beneficial. 3. Joint effusion. 4. Osteoarthritic changes.    LOCATION:  [facility]   Dictated by (CST): Angelito Guido MD on 5/01/2025 at 7:23 PM     Finalized by (CST): Angelito Guido MD on 5/01/2025 at 7:26 PM       XR CHEST AP PORTABLE  (CPT=71045)  Result Date: 5/1/2025  CONCLUSION:  1. Mildly displaced posterior 7th and 8th rib fractures. 2. Mild interstitial opacities may represent chronic interstitial changes versus mild edema.    LOCATION:  [facility]      Dictated by (CST): Angelito Guido MD on 5/01/2025 at 7:20 PM     Finalized by (CST): Angelito Guido MD on 5/01/2025 at 7:21 PM       XR PELVIS (COMPLETE MIN 3 VIEWS) (CPT=72190)  Result Date: 5/1/2025  CONCLUSION:  There are multiple large orthopedic screws noted scattered in the pelvis.  There is age-indeterminate fracture deformities of the superior pubic rami adjacent to screws.  Comparison with prior exams would be helpful to assess for interval change.    LOCATION:  [id number]   Dictated by (CST): Ro  MD Ibrahima on 5/01/2025 at 7:16 PM     Finalized by (CST): Ibrahima Starr MD on 5/01/2025 at 7:18 PM           Meds:     Scheduled Medications[1]  Medication Infusions[2]  PRN Medications[3]       Assessment/Plan:           Greg Majano Jr. is a(n) 80 year old male Dementia, eHTN, HLD who fell down the stairs in [place], was admitted to OSH, found to have right olecranon fracture, right rib fractures, bilateral unstable pelvic fractures w/ extraperitoneal hemorrhage, compression fractures at T4, T6 thought to be chronic, was seen by trauma and ortho, had ORIF of his right olecranon and pelvis. He had delirium, and required 1u pRBCs, he was taken back to his home in [place] by family who now presents w/  falls, inability to ambulate.      [facility] fall   S/p bilateral unstable pelvic fractures s/p ORIF at OSH in [place]  Right olecranon fracture s/p ORIF   H/o compression fractures at T4, T6  Low back pains  Right rib fractures   Pulmonary contusion  - patient w/ repeated falls, likely 2/2 dementia, physical deconditioning, impulsiveness, etc   - lumbar spine XR w/o fracture, prior T spine fractures eval by nsgy at OSH, no plans for surgery vs kypho, ortho at OSH rec WBAT for b/l LE and NWB of RUE     - pain control w/ norco prn   - consult pt/ot   - op ortho follow up      Minimally elevated bilirubin  - check t bili   - trend      Dementia   Agitated delirium   - cont home therapies   - b1 daily   - folate   - seroquel nightly   - check EKG to check qtc      eHTN  HLD  - lipitor   - resume home losartan      Anemia 2/2 abla from fractures and extraperitoneal bleed  - improved from prior, now down trending   - trend cbc daily   - hold aspirin, heparin for now     Dispo: pending labs this am and placement, given posey overnight will need to be restraint free x 24 hrs prior to dc     Beck Lewis DO      Supplementary Documentation:   DVT Mechanical Prophylaxis:   SCDs,    DVT Pharmacologic Prophylaxis   Medication    None      DVT Pharmacologic prophylaxis: Aspirin 162 mg         Code Status: Full Code  Rosa: No urinary catheter in place  Rosa Duration (in days):   Central line:    ELIAS:         **Certification      PHYSICIAN Certification of Need for Inpatient Hospitalization - Initial Certification    Patient will require inpatient services that will reasonably be expected to span two midnight's based on the clinical documentation in H+P.   Based on patients current state of illness, I anticipate that, after discharge, patient will require TBD.                                  [1]    folic acid  1 mg Oral Daily    losartan  25 mg Oral Daily    lidocaine-menthol  1 patch Transdermal Daily    atorvastatin  10 mg Oral Nightly    thiamine  100 mg Oral Daily    donepezil  10 mg Oral Nightly   [2] [3]   trolamine salicyliate    LORazepam    HYDROcodone-acetaminophen    acetaminophen

## 2025-05-03 NOTE — PHYSICAL THERAPY NOTE
PHYSICAL THERAPY EVALUATION - INPATIENT     Room Number: 3617/3617-A  Evaluation Date: 5/3/2025  Type of Evaluation: Initial  Physician Order: PT Eval and Treat    Presenting Problem: Fall  Co-Morbidities : Dementia, anxiety, frequent falls  pt with recent. Bilateral pubic rami fractures, bilateral posterior medial iliac bone fractures, left sacral ala fracture, right SI joint diastases. Underwent percutaneous fixation of the right sacral and iliac fracture, left sacrum fracture, and a bilateral superior ramus fracture  Reason for Therapy: Mobility Dysfunction and Discharge Planning    PHYSICAL THERAPY ASSESSMENT   Patient is a 80 year old male admitted 5/1/2025 for fall, weakness, multiple rib fractures.  Prior to admission, patient's baseline is prior to fall in April had been supervision assist for gait w/ rw and w/ adl's.  Patient is currently functioning below baseline with transfers, gait, and standing prolonged periods.  Patient is requiring minimal assist and moderate assist as a result of the following impairments: decreased functional strength, pain, impaired sitting/standing balance, decreased muscular endurance, cognitive deficits (dementia), and medical status.  Physical Therapy will continue to follow for duration of hospitalization.    Patient will benefit from continued skilled PT Services to promote return to prior level of function and safety with continuous assistance and gradual rehabilitative therapy .    PLAN DURING HOSPITALIZATION  Nursing Mobility Recommendation : 1 Assist  PT Device Recommendation: Wheelchair, LBQC  PT Treatment Plan: Bed mobility, Patient education, Family education, Gait training, Strengthening, Transfer training, Balance training  Rehab Potential : Good  Frequency (Obs): 3-5x/week     CURRENT GOALS    Goal #1 Patient is able to demonstrate supine - sit EOB @ level: modified independent     Goal #2 Patient is able to demonstrate transfers EOB to/from [facility] at assistance  level: supervision     Goal #3 Patient is able to ambulate 100 feet with assist device: cane - quad at assistance level: CGA     Goal #4 Work w/ patient on w/c mobiltiy as well.   Goal #5    Goal #6    Goal Comments: Goals established on 5/3/2025      PHYSICAL THERAPY MEDICAL/SOCIAL HISTORY  History related to current admission: Patient is a 80 year old male admitted on 5/1/2025 from home for a fall.  Pt diagnosed with recurrent fall after recent fall down stairs in [place] in April.  Pt w/ unstable pelvic fx s/p ORIF in [place], ORIF R olecranon as well.  Pt w/ thoracic and rib fractures w/ back pain.    HOME SITUATION  Type of Home: House  Home Layout: One level, Ramped entrance  Stairs to Enter : 0        Stairs to Bedroom: 0         Lives With: Spouse    Drives: No   Patient Regularly Uses: Rolling walker, Wheelchair      Prior Level of [place]: Prior to fall in [place], pt was supervision assist for adl's and gait w/ rw.    SUBJECTIVE  \"My back doesn't hurt, it's my arm!\"  Pt rubbing L arm repeatedly.    OBJECTIVE  Precautions: Bed/chair alarm, Limb alert - right  Fall Risk: High fall risk    WEIGHT BEARING RESTRICTION  R Upper Extremity: Non-Weight Bearing  R Lower Extremity: Weight Bearing as Tolerated  L Lower Extremity: Weight Bearing as Tolerated    PAIN ASSESSMENT  Rating: Unable to rate  Location: L ue, uncomfortable in unsupported sitting, rubbing arm, writhing in pain.  Better once in chair  Management Techniques: Repositioning    COGNITION  Overall Cognitive Status:  Impaired  Orientation Level:  oriented to person, disoriented to place, disoriented to time, and disoriented to situation  Memory:  impaired working memory  Following Commands:  follows one step commands without difficulty  Safety Judgement:  decreased awareness of need for assistance and decreased awareness of need for safety  Awareness of Deficits:  decreased awareness of deficits  Problem Solving:  assistance required to identify errors  made, assistance required to generate solutions, and assistance required to implement solutions    RANGE OF MOTION AND STRENGTH ASSESSMENT  Upper extremity ROM and strength -see OT evaluation  See note below as well    Lower extremity ROM is within functional limits     Lower extremity strength is within functional limits - grossly 4-/5    BALANCE  Static Sitting: Fair  Dynamic Sitting: Fair -  Static Standing: Poor +  Dynamic Standing: Poor +    ADDITIONAL TESTS                                    ACTIVITY TOLERANCE                         O2 WALK  Oxygen Therapy  SPO2% on Room Air at Rest: 95    NEUROLOGICAL FINDINGS                        AM-PAC '6-Clicks' INPATIENT SHORT FORM - BASIC MOBILITY  How much difficulty does the patient currently have...  Patient Difficulty: Turning over in bed (including adjusting bedclothes, sheets and blankets)?: None   Patient Difficulty: Sitting down on and standing up from a chair with arms (e.g., wheelchair, bedside commode, etc.): A Little   Patient Difficulty: Moving from lying on back to sitting on the side of the bed?: A Little   How much help from another person does the patient currently need...   Help from Another: Moving to and from a bed to a chair (including a wheelchair)?: A Little   Help from Another: Need to walk in hospital room?: A Little   Help from Another: Climbing 3-5 steps with a railing?: A Lot     AM-PAC Score:  Raw Score: 18   Approx Degree of Impairment: 46.58%   Standardized Score (AM-PAC Scale): 43.63   CMS Modifier (G-Code): CK    FUNCTIONAL ABILITY STATUS  Gait Assessment   Functional Mobility/Gait Assessment  Gait Assistance: Contact guard assist  Distance (ft): 3  Assistive Device: None  Pattern: Shuffle    Skilled Therapy Provided     Bed Mobility:  Rolling: Mod I to his Right  Supine to sit: Supervision assist, w/ CGA at the end b/c pt is slightly impulsive and appeared that he was going to move into standing quickly. Denied dizziness, but did have  severe L ue pain.  Pt sat on eob for at least 10 min due to equipment and line issues.  Pt shifting weight repeatedly in sitting due to pain.  Could not articulate exactly where his arm hurt.  Sit to supine: NT     Transfer Mobility:  Sit to stand: Completed w/ min a of 1, denied dizziness.   Stand to sit: CGA  Gait = Pt completed gait 3'x1 w/ CGA of 2 to bedside chair.  Did not pursue farther gait because pt is nwb and rw was only available. Pt also w/ high level of pain.  Recommend [place] walker or LBQC next session to progress gait.    Therapist's Comments: No observable swelling or redness noted in L ue, but pt is clearly having pain throughout his L ue - rubbing it and cannot sit still due to the pain.  Rn was informed of high level of pain and therapy suggested that pt may require additional imaging.  Pt's L ue supported in chair w/ pillow and pt appeared much more comfortable at end of session.    Exercise/Education Provided:  Bed mobility  Functional activity tolerated  Gait training  Transfer training    Patient End of Session: Up in chair, Needs met, Call light within reach, RN aware of session/findings, All patient questions and concerns addressed, Restraints, Alarm set      Patient Evaluation Complexity Level:  History Moderate - 1 or 2 personal factors and/or co-morbidities   Examination of body systems Moderate - addressing a total of 3 or more elements   Clinical Presentation  Moderate - Evolving   Clinical Decision Making Moderate Complexity       PT Session Time: 25 minutes  Gait Trainin minutes  Therapeutic Activity: 12 minutes  Neuromuscular Re-education: 0 minutes  Therapeutic Exercise: 0 minutes

## 2025-05-03 NOTE — PLAN OF CARE
Received pt in his room alert and coherent. Sinus in his tele.  Left shoulder pain, norco given per mar. Ativan for anxiety provided prn. Unsteady, SBA to the bathroom. Wife on board. Awaiting for insurance to DAMIEN. PT/OT to see. Bed in lowest position, bed alarm on. Safety and fall prec maintained. Will cont poc.      Problem: NEUROLOGICAL - ADULT  Goal: Achieves stable or improved neurological status  Description: INTERVENTIONS- Assess for and report changes in neurological status- Initiate measures to prevent increased intracranial pressure- Maintain blood pressure and fluid volume within ordered parameters to optimize cerebral perfusion and minimize risk of hemorrhage- Monitor temperature, glucose, and sodium. Initiate appropriate interventions as ordered  Outcome: Progressing     Problem: PAIN - ADULT  Goal: Verbalizes/displays adequate comfort level or patient's stated pain goal  Description: INTERVENTIONS:- Encourage pt to monitor pain and request assistance- Assess pain using appropriate pain scale- Administer analgesics based on type and severity of pain and evaluate response- Implement non-pharmacological measures as appropriate and evaluate response- Consider cultural and social influences on pain and pain management- Manage/alleviate anxiety- Utilize distraction and/or relaxation techniques- Monitor for opioid side effects- Notify MD/LIP if interventions unsuccessful or patient reports new pain- Anticipate increased pain with activity and pre-medicate as appropriate  Outcome: Progressing     Problem: SAFETY ADULT - FALL  Goal: Free from fall injury  Description: INTERVENTIONS:- Assess pt frequently for physical needs- Identify cognitive and physical deficits and behaviors that affect risk of falls.- [place] fall precautions as indicated by assessment.- Educate pt/family on patient safety including physical limitations- Instruct pt to call for assistance with activity based on assessment- Modify  environment to reduce risk of injury- Provide assistive devices as appropriate- Consider OT/PT consult to assist with strengthening/mobility- Encourage toileting schedule  Outcome: Progressing

## 2025-05-03 NOTE — CM/SW NOTE
PT/OT did eval today. Notes added to A[facility] referral    Anticipated therapy need: Gradual Rehabilitative Therapy  PASSR completed.    DAMIEN list given to wife. She will discuss with daughter who ios POA.    Will need insurance auth messaged [facility]- [facility]EN will need to go for auth once chosen.  Sw to follow up on DAMIEN choice.    Tomasa Kiran, DYLONW  /Discharge Planner

## 2025-05-03 NOTE — OCCUPATIONAL THERAPY NOTE
OCCUPATIONAL THERAPY EVALUATION - INPATIENT     Room Number: 3617/3617-A  Evaluation Date: 5/3/2025  Type of Evaluation: Initial  Presenting Problem: Falls, weakness    Physician Order: IP Consult to Occupational Therapy  Reason for Therapy: ADL/IADL Dysfunction and Discharge Planning    OCCUPATIONAL THERAPY ASSESSMENT   Patient is currently functioning below baseline with self-care and functional mobility. Prior to admission, patient's baseline is supervision w/ walker; most recently experiencing multiple falls.  Patient is requiring min to max A as a result of the following impairments: decreased functional strength, decreased functional reach, decreased endurance, pain, impaired sitting and standing balance, impaired coordination, impaired motor planning, difficulty maintaining precautions, cognitive deficits (attention, memory, safety), limited RUE ROM, and decreased safety awareness. Occupational Therapy will continue to follow for duration of hospitalization.    Patient will benefit from continued skilled OT Services to promote return to prior level of function and safety with continuous assistance and gradual rehabilitative therapy    History Related to Current Admission: Patient is a 80 year old male admitted on 5/1/2025 with Presenting Problem: Falls, weakness, multiple rib fx    Co-Morbidities : Dementia, anxiety, frequent falls  pt with recent. Bilateral pubic rami fractures, bilateral posterior medial iliac bone fractures, left sacral ala fracture, right SI joint diastases. Underwent percutaneous fixation of the right sacral and iliac fracture, left sacrum fracture, and a bilateral superior ramus fracture    Pt fell down the stairs while visiting family in [place] 4/17/25.  Admitted to OSH and found to have right olecranon fx, right rib fractures, B unstable pelvic fracture w/ extraperitoneal hemorrhage, compression fx T4 and T6; s/p ORIF right olecranon and pelvis    IMAGING  XR ELBOW, (2 VIEWS), RIGHT  (CPT=73070)  Result Date: 5/1/2025  CONCLUSION:  1. Postsurgical changes with plate and screw fixation of the proximal ulna. 2. There is an avulsion fracture fragment along the dorsal aspect of the distal humerus.  This may be chronic but correlation region of pain is recommended and if prior radiographs are available, comparison would be beneficial. 3. Joint effusion. 4. Osteoarthritic changes.    LOCATION:  [facility]   Dictated by (CST): Angelito Guido MD on 5/01/2025 at 7:23 PM     Finalized by (CST): Angelito Guido MD on 5/01/2025 at 7:26 PM        XR CHEST AP PORTABLE  (CPT=71045)  Result Date: 5/1/2025  CONCLUSION:  1. Mildly displaced posterior 7th and 8th rib fractures. 2. Mild interstitial opacities may represent chronic interstitial changes versus mild edema.    LOCATION:  [facility]      Dictated by (CST): Angelito Guido MD on 5/01/2025 at 7:20 PM     Finalized by (CST): Angelito Guido MD on 5/01/2025 at 7:21 PM        XR PELVIS (COMPLETE MIN 3 VIEWS) (CPT=72190)  Result Date: 5/1/2025  CONCLUSION:  There are multiple large orthopedic screws noted scattered in the pelvis.  There is age-indeterminate fracture deformities of the superior pubic rami adjacent to screws.  Comparison with prior exams would be helpful to assess for interval change.    LOCATION:  [id number]   Dictated by (CST): Ibrahima Starr MD on 5/01/2025 at 7:16 PM     Finalized by (CST): Ibrahima Starr MD on 5/01/2025 at 7:18 PM       FUNCTIONAL TRANSFER ASSESSMENT  Sit to Stand: Edge of Bed; Chair  Edge of Bed: Moderate Assist  Chair: Moderate Assist    BED MOBILITY  Rolling: Minimal Assist  Supine to Sit : Minimal Assist  Sit to Supine (OT): Minimal Assist  Scooting: min A    BALANCE ASSESSMENT  Static Sitting: Stand-by Assist  Static Standing: Minimal Assist    FUNCTIONAL ADL ASSESSMENT  Eating: Minimal Assist  LB Dressing Seated: Moderate Assist    ACTIVITY TOLERANCE: Tolerates standing <1 min w/ c/o fatigue, increased pain           BP:  (!) 171/93  BP Location: Left arm  BP Method: Automatic  Patient Position: Sitting    O2 SATURATIONS  Oxygen Therapy  SPO2% on Room Air at Rest: 95  SPO2% Ambulation on Room Air: 95    COGNITION  Arousal/Alertness:  appropriate responses to stimuli  Attention Span:  attends with cues to redirect  Orientation Level:  oriented to person, disoriented to place, disoriented to time, and disoriented to situation  Memory:  impaired working memory, decreased recall of precautions, decreased recall of recent events, decreased long term memory, and decreased short term memory  Following Commands:  follows one step commands with increased time and follows one step commands with repetition  Initiation: cues to initiate tasks  Motor Planning: impaired  Perseveration: not present  Safety Judgement:  decreased awareness of need for safety  Awareness of Errors:  decreased awareness of errors   Awareness of Deficits:  decreased awareness of deficits  Problem Solving:  assistance required to identify errors made, assistance required to generate solutions, and assistance required to implement solutions      Upper extremity  RUE immobilized at elbow and wrist and is NWB  Patient is able to actively flex right shoulder WFL  LUE nt formally d/t patient w/ c/o pain from shoulder down to fingers.  Observed active movement at shoulder, elbow, wrist and  of >/= 1/2 AROM    SENSATION  Light touch intact BUE; no c/o numbness or tingling    EDUCATION PROVIDED  Patient Education : Role of Occupational Therapy; Plan of Care; Functional Transfer Techniques; Fall Prevention; Posture/Positioning; Edema Reduction; Energy Conservation; Proper Body Mechanics  Patient's Response to Education: Requires Further Education  Family/Caregiver Education : Role of Occupational Therapy; Plan of Care; Discharge Recommendations; DME Recommendations; Functional Transfer Techniques; Fall Prevention  Family/Caregiver's Response to Education: Verbalized  Understanding    Equipment used: gait belt  Demonstrates functional use, Would benefit from additional trial     Therapist comments:   Patient w/ c/o L UE pain throughout session and frequently repositioning arm.  Patient's nurse alerted and xray ordered.   Patient required mod A to stand and pivot from EOB to chair.   Required frequent cues to maintain NWB to RUE    Patient End of Session: Up in chair, With  staff, Needs met, Call light within reach, RN aware of session/findings, All patient questions and concerns addressed, Hospital anti-slip socks, Alarm set, Family present, Discussed recommendations with / (vest restraint applied while in chair)    OCCUPATIONAL PROFILE    HOME SITUATION  Type of Home: House  Home Layout: One level, Ramped entrance  Lives With: Spouse    Toilet and Equipment: Comfort height toilet  Shower/Tub and Equipment: Walk-in shower, Tub-shower combo  Other Equipment:  (RW, w/c)    Occupation/Status: retired  Hand Dominance: Right  Drives: No  Patient Regularly Uses: Rolling walker, Wheelchair    Prior Level of Function: per wife,Kenzie, patient has h/o dementia and was functioning at supervision level for basic self-care and functional mobility without adaptive device.  Pt had a fall in Feb/2025 and started using a walker.  Pt had another significant fall down the stairs while visiting family in [place] at the end of April/2025 and has required use of w/c for safety d/t continued frequent falls.    SUBJECTIVE   \"Are you strapping me in for racing?\"     PAIN ASSESSMENT  Rating: Unable to rate  Location: left UE  Management Techniques: Relaxation, Repositioning, Nurse notified    OBJECTIVE  Precautions: Bed/chair alarm, Limb alert - right  Fall Risk: High fall risk    WEIGHT BEARING RESTRICTION  R Upper Extremity: Non-Weight Bearing  R Lower Extremity: Weight Bearing as Tolerated  L Lower Extremity: Weight Bearing as Tolerated      ASSESSMENTS    AM-PAC '6-Clicks'  Inpatient Daily Activity Short Form  -   Putting on and taking off regular lower body clothing?: A Lot  -   Bathing (including washing, rinsing, drying)?: A Lot  -   Toileting, which includes using toilet, bedpan or urinal? : A Lot  -   Putting on and taking off regular upper body clothing?: A Lot  -   Taking care of personal grooming such as brushing teeth?: A Little  -   Eating meals?: A Little    AM-PAC Score:  Score: 14  Approx Degree of Impairment: 59.67%  Standardized Score (AM-PAC Scale): 33.39    PLAN     OT Treatment Plan: Balance activities, Energy conservation/work simplification techniques, ADL training, Functional transfer training, UE strengthening/ROM, Cognitive reorientation, Endurance training, Patient/Family education, Patient/Family training, Equipment eval/education, Fine motor coordination activities, Compensatory technique education  Rehab Potential : Good  Frequency: 3-5x/week  Number of Visits to Meet Established Goals: 7    ADL Goals  Patient will perform toileting with min A and AE PRN  Patient will perform LB dressing with min A and AE PRN    Functional Transfer Goals  Patient will perform bed mobility supine to sit with supervision  Patient will perform bed mobility sit to supine with supervision  Patient will perform toilet transfer with supervision      Patient Evaluation Complexity Level:   Occupational Profile/Medical History MODERATE - Expanded review of history including review of medical or therapy record   Specific performance deficits impacting engagement in ADL/IADL MODERATE  3 - 5 performance deficits   Client Assessment/Performance Deficits MODERATE - Comorbidities and min to mod modifications of tasks    Clinical Decision Making MODERATE - Analysis of occupational profile, detailed assessments, several treatment options    Overall Complexity MODERATE     OT Session Time: 30 minutes  Self-Care Home Management: 15 minutes  Therapeutic Activity: 10 minutes

## 2025-05-03 NOTE — PLAN OF CARE
Patient is confused, orientated to self. Redirected often, increased patient rounding. Wife at bedside. Reporting pain to left shoulder, Xray ordered. PT/OT worked with patient, able to get to chair, wait for their rec. Decreased appetite, fluids started. Restraints continued.     15:39: Restraints discontinued. Patient is calm resting in bed, following directions. Spouse is at bedside.

## 2025-05-04 LAB
ALBUMIN SERPL-MCNC: 3.5 G/DL (ref 3.2–4.8)
ALP LIVER SERPL-CCNC: 237 U/L (ref 45–117)
ALT SERPL-CCNC: 8 U/L (ref 10–49)
ANION GAP SERPL CALC-SCNC: 14 MMOL/L (ref 0–18)
AST SERPL-CCNC: 25 U/L (ref ?–34)
BASOPHILS # BLD AUTO: 0.02 X10(3) UL (ref 0–0.2)
BASOPHILS NFR BLD AUTO: 0.3 %
BILIRUB DIRECT SERPL-MCNC: 0.8 MG/DL (ref ?–0.3)
BILIRUB SERPL-MCNC: 2.3 MG/DL (ref 0.2–1.1)
BUN BLD-MCNC: 11 MG/DL (ref 9–23)
CALCIUM BLD-MCNC: 8.6 MG/DL (ref 8.7–10.6)
CHLORIDE SERPL-SCNC: 105 MMOL/L (ref 98–112)
CO2 SERPL-SCNC: 21 MMOL/L (ref 21–32)
CREAT BLD-MCNC: 0.89 MG/DL (ref 0.7–1.3)
EGFRCR SERPLBLD CKD-EPI 2021: 87 ML/MIN/1.73M2 (ref 60–?)
EOSINOPHIL # BLD AUTO: 0.05 X10(3) UL (ref 0–0.7)
EOSINOPHIL NFR BLD AUTO: 0.7 %
ERYTHROCYTE [DISTWIDTH] IN BLOOD BY AUTOMATED COUNT: 17.1 %
GLUCOSE BLD-MCNC: 124 MG/DL (ref 70–99)
GLUCOSE BLD-MCNC: 64 MG/DL (ref 70–99)
GLUCOSE BLD-MCNC: 73 MG/DL (ref 70–99)
HCT VFR BLD AUTO: 27.1 % (ref 39–53)
HGB BLD-MCNC: 9 G/DL (ref 13–17.5)
IMM GRANULOCYTES # BLD AUTO: 0.04 X10(3) UL (ref 0–1)
IMM GRANULOCYTES NFR BLD: 0.6 %
LYMPHOCYTES # BLD AUTO: 0.95 X10(3) UL (ref 1–4)
LYMPHOCYTES NFR BLD AUTO: 13.6 %
MAGNESIUM SERPL-MCNC: 1.9 MG/DL (ref 1.6–2.6)
MCH RBC QN AUTO: 31.5 PG (ref 26–34)
MCHC RBC AUTO-ENTMCNC: 33.2 G/DL (ref 31–37)
MCV RBC AUTO: 94.8 FL (ref 80–100)
MONOCYTES # BLD AUTO: 0.63 X10(3) UL (ref 0.1–1)
MONOCYTES NFR BLD AUTO: 9.1 %
NEUTROPHILS # BLD AUTO: 5.27 X10 (3) UL (ref 1.5–7.7)
NEUTROPHILS # BLD AUTO: 5.27 X10(3) UL (ref 1.5–7.7)
NEUTROPHILS NFR BLD AUTO: 75.7 %
OSMOLALITY SERPL CALC.SUM OF ELEC: 287 MOSM/KG (ref 275–295)
PLATELET # BLD AUTO: 461 10(3)UL (ref 150–450)
POTASSIUM SERPL-SCNC: 3.8 MMOL/L (ref 3.5–5.1)
PROT SERPL-MCNC: 5.9 G/DL (ref 5.7–8.2)
RBC # BLD AUTO: 2.86 X10(6)UL (ref 3.8–5.8)
SODIUM SERPL-SCNC: 140 MMOL/L (ref 136–145)
WBC # BLD AUTO: 7 X10(3) UL (ref 4–11)

## 2025-05-04 RX ORDER — DEXTROSE MONOHYDRATE 25 G/50ML
50 INJECTION, SOLUTION INTRAVENOUS
Status: DISCONTINUED | OUTPATIENT
Start: 2025-05-04 | End: 2025-05-07

## 2025-05-04 RX ORDER — THIAMINE HYDROCHLORIDE 100 MG/ML
100 INJECTION, SOLUTION INTRAMUSCULAR; INTRAVENOUS DAILY
Status: DISCONTINUED | OUTPATIENT
Start: 2025-05-04 | End: 2025-05-05

## 2025-05-04 RX ORDER — NICOTINE POLACRILEX 4 MG
15 LOZENGE BUCCAL
Status: DISCONTINUED | OUTPATIENT
Start: 2025-05-04 | End: 2025-05-07

## 2025-05-04 RX ORDER — POTASSIUM CHLORIDE 1500 MG/1
40 TABLET, EXTENDED RELEASE ORAL ONCE
Status: COMPLETED | OUTPATIENT
Start: 2025-05-04 | End: 2025-05-04

## 2025-05-04 RX ORDER — DEXTROSE MONOHYDRATE, SODIUM CHLORIDE, AND POTASSIUM CHLORIDE 50; 1.49; 9 G/1000ML; G/1000ML; G/1000ML
INJECTION, SOLUTION INTRAVENOUS CONTINUOUS
Status: DISPENSED | OUTPATIENT
Start: 2025-05-04 | End: 2025-05-06

## 2025-05-04 RX ORDER — NICOTINE POLACRILEX 4 MG
30 LOZENGE BUCCAL
Status: DISCONTINUED | OUTPATIENT
Start: 2025-05-04 | End: 2025-05-07

## 2025-05-04 NOTE — PROGRESS NOTES
[facility] Hospitalist Progress Note     CC: Hospital Follow up - falls    PCP: Les Bella MD       Subjective:     - slept better with seroquel and did receive a dose of ativan  - requesting to go home, but demonstrates poor insight into his current situation   - wife at bedside     OBJECTIVE:    Blood pressure 145/63, pulse 66, temperature 98.4 °F (36.9 °C), temperature source Oral, resp. rate 16, height 5' 9\" (1.753 m), weight 154 lb 1.6 oz (69.9 kg), SpO2 95%.    Temp:  [97.8 °F (36.6 °C)-98.5 °F (36.9 °C)] 98.4 °F (36.9 °C)  Pulse:  [63-70] 66  Resp:  [16-18] 16  BP: (145-171)/(55-93) 145/63  SpO2:  [95 %] 95 %      Intake/Output:    Intake/Output Summary (Last 24 hours) at 5/4/2025 1025  Last data filed at 5/4/2025 0619  Gross per 24 hour   Intake 1278.8 ml   Output 1800 ml   Net -521.2 ml       Last 3 Weights   05/01/25 2112 154 lb 1.6 oz (69.9 kg)   05/01/25 2107 154 lb (69.9 kg)   05/01/25 1528 145 lb (65.8 kg)   01/11/22 1152 172 lb (78 kg)   12/28/21 1144 166 lb (75.3 kg)       Exam   Gen: Alert, no acute distress  Heent: Normocephalic, atraumatic, neck supple, EOMI, PERRLA  Pulm: Lungs CTAB, normal respiratory effort  CV:  Regular rate and rhythm, no murmurs/rubs/gallops  Abd: Soft, nontender, nondistended, bowel sounds present  Extremities: No peripheral edema, no clubbing, pulses intact   Skin: No rashes or lesions  Neuro: a and o to person, somewhat to place, not to situation   Psych: appropriate mood and affect      Data Review:       Labs:     Recent Labs   Lab 05/01/25  1842 05/02/25  0647 05/03/25  1436 05/04/25  0702   RBC 3.11* 2.60* 2.85* 2.86*   HGB 9.7* 8.1* 9.0* 9.0*   HCT 29.6* 24.4* 27.1* 27.1*   MCV 95.2 93.8 95.1 94.8   MCH 31.2 31.2 31.6 31.5   MCHC 32.8 33.2 33.2 33.2   RDW 17.4 17.2 17.0 17.1   NEPRELIM 7.62  --  4.71 5.27   WBC 10.1 6.1 6.7 7.0   .0* 412.0 445.0 461.0*         Recent Labs   Lab 05/02/25  0647 05/02/25  1604 05/03/25  1436 05/04/25  0722    GLU 98  --  83 64*   BUN 13  --  10 11   CREATSERUM 0.84  --  0.81 0.89   EGFRCR 88  --  89 87   CA 8.2*  --  8.8 8.6*     --  137 140   K 3.6  3.6 4.9 4.1 3.8     --  105 105   CO2 25.0  --  22.0 21.0       Recent Labs   Lab 04/30/25  1322 05/01/25  1842 05/02/25  0844 05/03/25  1436   ALT 10 10 8* 8*   AST 24 27 23 24   ALB 3.3 3.8 3.5 3.7         Imaging:  XR SHOULDER, COMPLETE (MIN 2 VIEWS), LEFT (CPT=73030)  Result Date: 5/3/2025  CONCLUSION:  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  There is no acute fracture detected.   LOCATION:  [facility]   Dictated by (CST): Tanvir Spain MD on 5/03/2025 at 3:32 PM     Finalized by (CST): Tanvir Spain MD on 5/03/2025 at 3:33 PM       XR LUMBAR SPINE (MIN 2 VIEWS) (CPT=72100)  Result Date: 5/2/2025  CONCLUSION:   1.  Grade 1 anterolisthesis of L4 on L5 secondary to moderate facet joint degenerative change.   2. Mild degenerative disc disease at L5-S1.   LOCATION:  [facility]   Dictated by (CST): Carlos Collins MD on 5/02/2025 at 10:36 AM     Finalized by (CST): Carlos Collins MD on 5/02/2025 at 10:37 AM       XR ELBOW, (2 VIEWS), RIGHT (CPT=73070)  Result Date: 5/1/2025  CONCLUSION:  1. Postsurgical changes with plate and screw fixation of the proximal ulna. 2. There is an avulsion fracture fragment along the dorsal aspect of the distal humerus.  This may be chronic but correlation region of pain is recommended and if prior radiographs are available, comparison would be beneficial. 3. Joint effusion. 4. Osteoarthritic changes.    LOCATION:  [facility]   Dictated by (CST): Angelito Guido MD on 5/01/2025 at 7:23 PM     Finalized by (CST): Angelito Guido MD on 5/01/2025 at 7:26 PM       XR CHEST AP PORTABLE  (CPT=71045)  Result Date: 5/1/2025  CONCLUSION:  1. Mildly displaced posterior 7th and 8th rib fractures. 2. Mild interstitial opacities may represent chronic interstitial changes versus mild edema.    LOCATION:  [facility]      Dictated by (CST): Angelito Guido MD  on 5/01/2025 at 7:20 PM     Finalized by (CST): Angelito Guido MD on 5/01/2025 at 7:21 PM       XR PELVIS (COMPLETE MIN 3 VIEWS) (CPT=72190)  Result Date: 5/1/2025  CONCLUSION:  There are multiple large orthopedic screws noted scattered in the pelvis.  There is age-indeterminate fracture deformities of the superior pubic rami adjacent to screws.  Comparison with prior exams would be helpful to assess for interval change.    LOCATION:  [facility]   Dictated by (CST): Ibrahima Starr MD on 5/01/2025 at 7:16 PM     Finalized by (CST): Ibrahima Starr MD on 5/01/2025 at 7:18 PM           Meds:     Scheduled Medications[1]  Medication Infusions[2]  PRN Medications[3]       Assessment/Plan:     Greg Majano Jr. is a(n) 80 year old male Dementia, eHTN, HLD who fell down the stairs in [place], was admitted to OSH, found to have right olecranon fracture, right rib fractures, bilateral unstable pelvic fractures w/ extraperitoneal hemorrhage, compression fractures at T4, T6 thought to be chronic, was seen by trauma and ortho, had ORIF of his right olecranon and pelvis. He had delirium, and required 1u pRBCs, he was taken back to his home in [place] by family who now presents w/  falls, inability to ambulate.      [facility] fall   S/p bilateral unstable pelvic fractures s/p ORIF at OSH in [place]  Right olecranon fracture s/p ORIF   H/o compression fractures at T4, T6  Low back pains  Right rib fractures   Pulmonary contusion  - patient w/ repeated falls, likely 2/2 dementia, physical deconditioning, impulsiveness, etc   - lumbar spine XR w/o fracture, prior T spine fractures eval by nsgy at OSH, no plans for surgery vs kypho, ortho at OSH rec WBAT for b/l LE and NWB of RUE     - pain control w/ norco prn   - consult pt/ot   - op ortho follow up      Minimally elevated bilirubin  - trend  - if LFTs are stable or worse today, will obtain RUQ US      Dementia   Agitated delirium   - cont home therapies   - seroquel nightly added  - qtc  460  - will ask for psych consult for decision making capacity as patient is requesting to dc home, not currently medically safe, also should dc to rehab given multiple falls and serious fractures      eHTN  HLD  - lipitor   - resume home losartan      Anemia 2/2 abla from fractures and extraperitoneal bleed  - improved from prior, now down trending   - trend cbc daily   - restart aspirin, lovenox     Hypoglycemia  - 2/2 poor po intake  - iv thiamine, then switch iv fluids to d5 0.9 w/ 20mEq K at 50mL/hr     Dispo: pending labs this am and placement, given posey overnight will need to be restraint free x 24 hrs prior to dc     Beck Lewis DO      Supplementary Documentation:   DVT Mechanical Prophylaxis:   SCDs,    DVT Pharmacologic Prophylaxis   Medication    enoxaparin (Lovenox) 40 MG/0.4ML SUBQ injection 40 mg      DVT Pharmacologic prophylaxis: Aspirin 162 mg         Code Status: Full Code  Rosa: External urinary catheter in place  Rosa Duration (in days):   Central line:    ELIAS:         **Certification      PHYSICIAN Certification of Need for Inpatient Hospitalization - Initial Certification    Patient will require inpatient services that will reasonably be expected to span two midnight's based on the clinical documentation in H+P.   Based on patients current state of illness, I anticipate that, after discharge, patient will require TBD.                                  [1]    thiamine  100 mg Intravenous Daily    QUEtiapine  25 mg Oral Nightly    enoxaparin  40 mg Subcutaneous Nightly    folic acid  1 mg Oral Daily    losartan  25 mg Oral Daily    lidocaine-menthol  1 patch Transdermal Daily    atorvastatin  10 mg Oral Nightly    donepezil  10 mg Oral Nightly   [2]    potassium chloride in dextrose 5%-sodium chloride 0.9%     [3]   HYDROcodone-acetaminophen **OR** HYDROcodone-acetaminophen    trolamine salicyliate    LORazepam    acetaminophen

## 2025-05-04 NOTE — PROGRESS NOTES
Patient oriented to self only, confused. Frequent redirection required. Mild agitation at times. No restraints overnight. Pain managed with PRNs per MAR. Patient endorses left arm and shoulder pain. Voiding freely with Primofit. IV fluids infusing as ordered. Wife at bedside. Discussed plan of care with wife and family at bedside. They are requesting additional discussions with social work about placement options. Bed locked in lowest position, alarm on. All appropriate safety measures in place.

## 2025-05-04 NOTE — PLAN OF CARE
Problem: NEUROLOGICAL - ADULT  Goal: Achieves stable or improved neurological status  Description: INTERVENTIONS- Assess for and report changes in neurological status- Initiate measures to prevent increased intracranial pressure- Maintain blood pressure and fluid volume within ordered parameters to optimize cerebral perfusion and minimize risk of hemorrhage- Monitor temperature, glucose, and sodium. Initiate appropriate interventions as ordered  Outcome: Progressing     Problem: PAIN - ADULT  Goal: Verbalizes/displays adequate comfort level or patient's stated pain goal  Description: INTERVENTIONS:- Encourage pt to monitor pain and request assistance- Assess pain using appropriate pain scale- Administer analgesics based on type and severity of pain and evaluate response- Implement non-pharmacological measures as appropriate and evaluate response- Consider cultural and social influences on pain and pain management- Manage/alleviate anxiety- Utilize distraction and/or relaxation techniques- Monitor for opioid side effects- Notify MD/LIP if interventions unsuccessful or patient reports new pain- Anticipate increased pain with activity and pre-medicate as appropriate  Outcome: Progressing     Problem: SAFETY ADULT - FALL  Goal: Free from fall injury  Description: INTERVENTIONS:- Assess pt frequently for physical needs- Identify cognitive and physical deficits and behaviors that affect risk of falls.- [place] fall precautions as indicated by assessment.- Educate pt/family on patient safety including physical limitations- Instruct pt to call for assistance with activity based on assessment- Modify environment to reduce risk of injury- Provide assistive devices as appropriate- Consider OT/PT consult to assist with strengthening/mobility- Encourage toileting schedule  Outcome: Progressing     Problem: Safety Risk - Non-Violent Restraints  Goal: Patient will remain free from self-harm  Description: INTERVENTIONS:- Apply  the least restrictive restraint to prevent harm- Notify patient and family of reasons restraints applied- Assess for any contributing factors to confusion (electrolyte disturbances, delirium, medications)- Discontinue any unnecessary medical devices as soon as possible- Assess the patient's physical comfort, circulation, skin condition, hydration, nutrition and elimination needs - Reorient and redirection as needed- Assess for the need to continue restraints  Outcome: Progressing    Assumed care 0730   AOx1-2   Pain controlled with PRN's   VSS  Wife at bedside during shift   Call light within reach , no questions or concerns at this time  POC reviewed with wife

## 2025-05-05 PROBLEM — G30.9 ALZHEIMER'S DEMENTIA WITH BEHAVIORAL DISTURBANCE (HCC): Status: ACTIVE | Noted: 2025-05-05

## 2025-05-05 PROBLEM — F32.1 MAJOR DEPRESSIVE DISORDER, SINGLE EPISODE, MODERATE (HCC): Status: ACTIVE | Noted: 2025-05-05

## 2025-05-05 PROBLEM — F02.818 ALZHEIMER'S DEMENTIA WITH BEHAVIORAL DISTURBANCE (HCC): Status: ACTIVE | Noted: 2025-05-05

## 2025-05-05 LAB
ALBUMIN SERPL-MCNC: 3.3 G/DL (ref 3.2–4.8)
ALP LIVER SERPL-CCNC: 256 U/L (ref 45–117)
ALT SERPL-CCNC: 9 U/L (ref 10–49)
ANION GAP SERPL CALC-SCNC: 7 MMOL/L (ref 0–18)
AST SERPL-CCNC: 25 U/L (ref ?–34)
BASOPHILS # BLD AUTO: 0.02 X10(3) UL (ref 0–0.2)
BASOPHILS NFR BLD AUTO: 0.5 %
BILIRUB DIRECT SERPL-MCNC: 0.6 MG/DL (ref ?–0.3)
BILIRUB SERPL-MCNC: 1.8 MG/DL (ref 0.2–1.1)
BUN BLD-MCNC: 7 MG/DL (ref 9–23)
CALCIUM BLD-MCNC: 8.6 MG/DL (ref 8.7–10.6)
CHLORIDE SERPL-SCNC: 111 MMOL/L (ref 98–112)
CO2 SERPL-SCNC: 21 MMOL/L (ref 21–32)
CREAT BLD-MCNC: 0.82 MG/DL (ref 0.7–1.3)
EGFRCR SERPLBLD CKD-EPI 2021: 89 ML/MIN/1.73M2 (ref 60–?)
EOSINOPHIL # BLD AUTO: 0.07 X10(3) UL (ref 0–0.7)
EOSINOPHIL NFR BLD AUTO: 1.7 %
ERYTHROCYTE [DISTWIDTH] IN BLOOD BY AUTOMATED COUNT: 17 %
GLUCOSE BLD-MCNC: 108 MG/DL (ref 70–99)
GLUCOSE BLD-MCNC: 112 MG/DL (ref 70–99)
GLUCOSE BLD-MCNC: 119 MG/DL (ref 70–99)
GLUCOSE BLD-MCNC: 132 MG/DL (ref 70–99)
GLUCOSE BLD-MCNC: 97 MG/DL (ref 70–99)
HCT VFR BLD AUTO: 28.3 % (ref 39–53)
HGB BLD-MCNC: 9.3 G/DL (ref 13–17.5)
IMM GRANULOCYTES # BLD AUTO: 0.03 X10(3) UL (ref 0–1)
IMM GRANULOCYTES NFR BLD: 0.7 %
LYMPHOCYTES # BLD AUTO: 1.17 X10(3) UL (ref 1–4)
LYMPHOCYTES NFR BLD AUTO: 28.2 %
MAGNESIUM SERPL-MCNC: 1.8 MG/DL (ref 1.6–2.6)
MCH RBC QN AUTO: 31.5 PG (ref 26–34)
MCHC RBC AUTO-ENTMCNC: 32.9 G/DL (ref 31–37)
MCV RBC AUTO: 95.9 FL (ref 80–100)
MONOCYTES # BLD AUTO: 0.51 X10(3) UL (ref 0.1–1)
MONOCYTES NFR BLD AUTO: 12.3 %
NEUTROPHILS # BLD AUTO: 2.35 X10 (3) UL (ref 1.5–7.7)
NEUTROPHILS # BLD AUTO: 2.35 X10(3) UL (ref 1.5–7.7)
NEUTROPHILS NFR BLD AUTO: 56.6 %
OSMOLALITY SERPL CALC.SUM OF ELEC: 286 MOSM/KG (ref 275–295)
PLATELET # BLD AUTO: 408 10(3)UL (ref 150–450)
POTASSIUM SERPL-SCNC: 4.1 MMOL/L (ref 3.5–5.1)
POTASSIUM SERPL-SCNC: 4.1 MMOL/L (ref 3.5–5.1)
PROT SERPL-MCNC: 5.7 G/DL (ref 5.7–8.2)
RBC # BLD AUTO: 2.95 X10(6)UL (ref 3.8–5.8)
SODIUM SERPL-SCNC: 139 MMOL/L (ref 136–145)
WBC # BLD AUTO: 4.2 X10(3) UL (ref 4–11)

## 2025-05-05 PROCEDURE — 90792 PSYCH DIAG EVAL W/MED SRVCS: CPT | Performed by: OTHER

## 2025-05-05 RX ORDER — MIRTAZAPINE 7.5 MG/1
7.5 TABLET, FILM COATED ORAL NIGHTLY
Status: DISCONTINUED | OUTPATIENT
Start: 2025-05-05 | End: 2025-05-07

## 2025-05-05 RX ORDER — MELATONIN
100 DAILY
Status: DISCONTINUED | OUTPATIENT
Start: 2025-05-06 | End: 2025-05-07

## 2025-05-05 RX ORDER — MAGNESIUM OXIDE 400 MG/1
400 TABLET ORAL ONCE
Status: COMPLETED | OUTPATIENT
Start: 2025-05-05 | End: 2025-05-05

## 2025-05-05 NOTE — OCCUPATIONAL THERAPY NOTE
OCCUPATIONAL THERAPY TREATMENT NOTE - INPATIENT     Room Number: 3617/3617-A  Session: 1   Number of Visits to Meet Established Goals: 7    Presenting Problem: Falls, weakness    HOME SITUATION  Type of Home: House  Home Layout: One level, Ramped entrance  Lives With: Spouse  Toilet and Equipment: Comfort height toilet  Shower/Tub and Equipment: Walk-in shower, Tub-shower combo  Other Equipment:  (RW, w/c)  Occupation/Status: retired  Hand Dominance: Right  Drives: No  Patient Regularly Uses: Rolling walker, Wheelchair  Prior Level of Function: per wife,Kenzie, patient has h/o dementia and was functioning at supervision level for basic self-care and functional mobility without adaptive device.  Pt had a fall in Feb/2025 and started using a walker.  Pt had another significant fall down the stairs while visiting family in [place] at the end of April/2025 and has required use of w/c for safety d/t continued frequent falls.      ASSESSMENT   Patient demonstrates limited progress this session, goals remain in progress.    Patient continues to function below baseline with lower body dressing, transfers, performing household tasks, and safety awareness/memory .   Contributing factors to remaining limitations include cognitive deficits (impaired memory and safety awareness, impulsivity), decreased compliance/participation, decreased insight to deficits, decreased safety awareness, and NWB on RUE .  Next session anticipate patient to progress toileting, upper body dressing, grooming, and transfers.  Occupational Therapy will continue to follow patient for duration of hospitalization.    Patient continues to benefit from continued skilled OT services: to promote return to prior level of function and safety with continuous assistance and gradual rehabilitative therapy.     History:   History Related to Current Admission: Patient is a 80 year old male admitted on 5/1/2025 with Presenting Problem: Falls, weakness, multiple rib fx      Co-Morbidities : Dementia, anxiety, frequent falls  pt with recent. Bilateral pubic rami fractures, bilateral posterior medial iliac bone fractures, left sacral ala fracture, right SI joint diastases. Underwent percutaneous fixation of the right sacral and iliac fracture, left sacrum fracture, and a bilateral superior ramus fracture     Pt fell down the stairs while visiting family in [place] 4/17/25.  Admitted to OSH and found to have right olecranon fx, right rib fractures, B unstable pelvic fracture w/ extraperitoneal hemorrhage, compression fx T4 and T6; s/p ORIF right olecranon and pelvis       WEIGHT BEARING RESTRICTION  R Upper Extremity: Non-Weight Bearing  R Lower Extremity: Weight Bearing as Tolerated  L Lower Extremity: Weight Bearing as Tolerated      Recommendations for nursing staff:   Transfers: recommend 2 ppl for safety  Toileting location: bed level  ( patient not following commands and impulsive this date )    TREATMENT SESSION:  Patient Start of Session: supine; family member sleeping on couch    FUNCTIONAL TRANSFER ASSESSMENT  Sit to Stand: Edge of Bed; Chair  Edge of Bed: Moderate Assist  Chair: Moderate Assist    BED MOBILITY  Rolling: Supervision  Supine to Sit : Supervision  Sit to Supine (OT): Supervision  Scooting: SBA    BALANCE ASSESSMENT  Static Sitting: Stand-by Assist  Static Standing: Minimal Assist    FUNCTIONAL ADL ASSESSMENT  Eating: Minimal Assist  LB Dressing Seated: Moderate Assist    ACTIVITY TOLERANCE: no deficits noted                         O2 SATURATIONS       EDUCATION PROVIDED  Patient Education : Plan of Care; Other (safety cues  for impulsivity)  Patient's Response to Education: Requires Further Education  Family/Caregiver Education : Role of Occupational Therapy; Plan of Care; Discharge Recommendations; DME Recommendations; Functional Transfer Techniques; Fall Prevention  Family/Caregiver's Response to Education: Verbalized Understanding    Equipment used:  none  Demonstrates functional use, Would benefit from additional trial yes     Exercises:    Exercises Repetitions Comments   Scapular elevation     Scapular retraction     Shoulder rolls     Shoulder flexion     Shoulder abduction     Shoulder internal/external rotation     Forward punch     Elbow flexion     Elbow extension     Forearm pronation/supination     Wrist flexion/extension     Gross grasp/fist pumps     Ankle pumps     Knee extension     Marching           Therapist comments: Patient was educated on purpose of therapy and goal to have him get up from bed and move. Patient became upset/agitated when covers were removed despite verbal warning. He impulsively sat up, quickly swung legs over and began to try ot stand up unassisted , and was fully placing weight through RUE in this process. Patient was instructed to lie back down . Nurse present for attempt. Bed alarm placed.   Patient End of Session: RN aware of session/findings, Call light within reach, Needs met, With  staff, Alarm set, Family present, Hospital anti-slip socks, All patient questions and concerns addressed, In bed, Other (Comment) (posey vest on but not connected, as he was before start of session)    SUBJECTIVE  Irritable    PAIN ASSESSMENT  Rating: Other (Comment) (did not report pain)  Location: left UE  Management Techniques: Relaxation, Repositioning, Nurse notified     OBJECTIVE  Precautions: Bed/chair alarm, Limb alert - right    AM-PAC ‘6-Clicks’ Inpatient Daily Activity Short Form  -   Putting on and taking off regular lower body clothing?: A Lot  -   Bathing (including washing, rinsing, drying)?: A Lot  -   Toileting, which includes using toilet, bedpan or urinal? : A Lot  -   Putting on and taking off regular upper body clothing?: A Lot  -   Taking care of personal grooming such as brushing teeth?: None  -   Eating meals?: None    AM-PAC Score:  Score: 16  Approx Degree of Impairment: 53.32%  Standardized Score (AM-PAC Scale):  35.96    PLAN     OT Treatment Plan: Balance activities, Energy conservation/work simplification techniques, ADL training, Functional transfer training, UE strengthening/ROM, Cognitive reorientation, Endurance training, Patient/Family education, Patient/Family training, Equipment eval/education, Fine motor coordination activities, Compensatory technique education  Rehab Potential : Good  Frequency: 3-5x/week    OT Goals: ongoing 5/5  ADL Goals  Patient will perform toileting with min A and AE PRN  Patient will perform LB dressing with min A and AE PRN     Functional Transfer Goals  Patient will perform bed mobility supine to sit with supervision  Patient will perform bed mobility sit to supine with supervision  Patient will perform toilet transfer with supervision    OT Session Time: 8 minutes    Therapeutic Activity: 8 minutes

## 2025-05-05 NOTE — CM/SW NOTE
SW received message from RN stating pt's spouse and daughter are undecided regarding [facility] choice. SW called pt's daughter/HCPOA Jimenez to discuss [facility] choice.     Pt's dtr stated she is interested in [facility] but inquired if there are any additional facilities located in [place]. SW informed pt's dtr [facility] may be the only facility located in [place], but there are additional facilities in the surrounding areas. Pt's dtr stated she toured [facility] and is interested in that facility as well. Pt's dtr stated she was informed by [facility] there is a memory side and a therapy side. Pt's dtr inquired if facilities have the ability to restrain pt if he is agitated or give medication. SW informed pt's dtr it is against SNF rules/regulations to restrain pt or administer Haldol. SW informed pt's dtr the facilities have interventions and de-escalation strategies that they are able to utilize. SW offered to have facilities reach out to pt's dtr to address/answer questions, pt's dtr declined. SW informed pt's dtr insurance authorization will be needed prior to discharge. Pt's dtr stated her two preferences for [facility] are [facility] and [facility].     SW messaged [facility] and [facility] in [facility] to inquire if the facility is able to accept and if they are in-network with pt's insurance. SW will f/u with pt's dtr regarding [facility] choice.     Addendum (1:15pm) - KATHY received message from [facility] stating they are unable to accept pt and recommended a referral sent to [facility].  KATHY received message from Tomasa at [facility] stating the facility is in network with pt's insurance.     Updated pt's dtr Jimenez via phone. Pt's dtr selected [facility] for [facility].     KATHY reserved [facility] in [facility] and messaged the facility to initiate insurance authorization for [facility]. KATHY will continue to follow.     RENETTA Cain  Discharge Planner

## 2025-05-05 NOTE — CONSULTS
[facility]    PATIENT'S NAME: CATERINA MCCLELLAN JR.   ATTENDING PHYSICIAN: Vonda Wagner M.D.   CONSULTING PHYSICIAN: Eitan Cloud DO   PATIENT ACCOUNT#:   [id number]    LOCATION:  [address] [place] [address]  MEDICAL RECORD #:   [id number]       YOB: 1945  ADMISSION DATE:       05/01/2025      CONSULT DATE:  05/05/2025    REPORT OF CONSULTATION    DATE OF SERVICE:  05/05/2025.    REFERRING PHYSICIAN:  Beck Lewis DO    REASON FOR CONSULTATION/CHIEF COMPLAINT:  \"Evaluate for capacity.\"    HISTORY OF PRESENT ILLNESS:  Patient is an 80-year-old male with a history of Alzheimer dementia, who presented to the hospital due to multiple falls.  He was recently in [place] and had several falls there, resulting in fractures to his pelvis, right olecranon, requiring open reduction and internal fixation; as well as compression fractures in T4, T6; as well as right rib fractures and pulmonary contusion.  He was hospitalized in [place] and did suffer from postoperative delirium and then transferred back here to his home in [place] but continues to have falls, and family is unable to care for him.  He was brought to the hospital, and staff are working towards getting him into a subacute rehab, but patient has been fixated about wanting to go home and is not accepting of the need to go to rehab.  Thus, I am asked to assess his capacity to make his own medical decisions.  Patient is interviewed with the presence of his wife, who is the power of .  The patient is oriented to self only and is unaware of why he is here.  He does not recall falling but does know that he has injured himself, but cannot say how he has injured himself.  He does not recall much details about the falls, but seems to minimize their severity.  Family are concerned about his falling and do not feel like he is safe to be home and want him to be in a subacute rehab.  The patient was initially open to getting physical therapy, but when I  pointed out that he would need to go to rehab facility and stay there for at least a few weeks to get treatment, he firmly said, \"Hell no, I'm not going.\"  When asked why he is not going, he could not say but seems to refer to the fact that he wants to be at home.  I explained to him that his injuries are quite severe and that without extensive physical therapy he is going to have continued falls and potentially could fracture more bones or hit his head and bleed into his brain and die.  He does not seem to understand the severity of his actions and does not understand that going home is posing a greater risk to himself.  He initially stated his mood is fine, but when I asked him about his family members, he began to become extremely tearful and dysphoric.  He is not suicidal or homicidal and states he wants to live and identifies his family as protective factors.  He has not been eating consistently and not sleeping regularly at night.  I do see that he has been ordered on Aricept but unclear if this is a new medication for him.  This medicine is known to cause poor appetite and weight loss and unclear if this is a contributing factor.  He denies any prominent worrying symptoms or panic symptoms but does report using marijuana regularly as well as drinking 2 beers daily, and when I asked why he uses these substances, he stated, \"To help me mellow out.\"  He denies any auditory or visual hallucinations but does at times become extremely fernandez and irritable and has been started on Seroquel 25 mg at bedtime while he has been here due to restlessness at night.  He also has required a Posey vest at night.  No p.r.n. medications have been required and no physical combativeness noted.  I did speak with wife privately and she is pursuing skilled rehab with the help of the discharge planner, and we are hopeful to find placement soon.    PAST PSYCHIATRIC HISTORY:  Patient denies any prior psychiatric treatment or psychiatric  hospitalizations.  He has no current outpatient providers.  He has no history of any suicide attempts.    PAST MEDICAL HISTORY:  Recent fall with fractures described above, history of anemia, dementia, hypertension, muscle weakness, osteoarthritis.      MEDICATIONS:    Current Outpatient Medications   Medication Instructions    ALPRAZolam (XANAX) 0.5 mg, Oral, Daily    aspirin 81 mg, 2 times daily    atorvastatin (LIPITOR) 10 mg, Oral, Daily    donepezil (ARICEPT) 10 mg, Nightly    folic acid (FOLVITE) 1 mg, Daily    HYDROcodone-acetaminophen 5-325 MG Oral Tab 1 tablet, Oral, Every 4 hours PRN, FOR PAIN    LOSARTAN 25 MG Oral Tab TAKE 1 TABLET BY MOUTH EVERY DAY    Meloxicam 15 mg, Daily    Sildenafil Citrate (VIAGRA) 50 MG Oral Tab One po 30 min to 60 minutes prior to intercourse    thiamine (VITAMIN B1) 100 mg, Oral, Daily     ALLERGIES:  No known drug allergies.      SOCIAL HISTORY:  Patient is  with adult children.  He worked as a  and then as an  but now is retired.  He lives at home with family.  Wife is power of  and main caregiver.  He needs assistance with ADLs and IADLs and is no longer driving.  The patient cannot tell me his correct age, believing that he is in his 70s when, in fact, he is 80.      FAMILY HISTORY:  Patient denies any dementia or psychiatric history in the family.    SUBSTANCE ABUSE HISTORY:  Patient does use 2 beers daily, as well as smokes unspecified quantity of marijuana, but denies any tobacco use.  Has no desire to stop using marijuana.    REVIEW OF SYSTEMS:  Ten out of 14 systems reviewed, otherwise negative except as noted above.      CURRENT VITAL SIGNS:  Temperature 98 degrees Fahrenheit, pulse 75, respirations 18, blood pressure 174/79, O2 saturation 95% on room air.    MENTAL STATUS EXAMINATION:  An 80-year-old  male, who looks younger than his stated age, lying in bed, somewhat confrontational but later in the interview  becoming extremely tearful and dysphoric, with fair eye contact, fair engagement in the interview.  Mood:  Patient reports mood as \"fine.\"  Affect is tearful, labile, mood incongruent.  Thought processes were linear, logical, and goal directed.  Thought content showed ongoing ruminations about returning home.  No current suicidal or homicidal thoughts.  Speech is fluent with good volume and output.  Motor exam showed normal kinetics, no tremors noted.  Attention and concentration impaired, could not spell the word world backwards.  Insight is poor, unaware of current medical needs.  Judgment is poor, unaware of the need for treatment.  Orientation:  Awake, alert, oriented to self only.  Short-term memory impaired, patient is able to repeat 3 words but only able to recall 1 out of 3 after 5 minutes.  Recent memory impaired, could not recall events leading up to admission.  Long-term memory preserved, able to recall work history and marital history.  Loosening of associations:  None.  Naming:  Patient able to name a pen, a watch, and a chair.  Fund of knowledge is poor, unaware of current world events or recent presidents.    SUICIDE RISK ASSESSMENT:    1.   Overall Level of Risk:  Low risk for self-harm.  2.   Risk Factors:  a. Dementia.  b. Advanced age.  c. History of mood disorder.  d. Impulsivity.  3.   Environmental Factors:  a. Supervised living arrangement.  b. Close outpatient followup.   4.   Mitigating (Protective) Factors:  a. No prior attempts.  b. No current ideation.  c. Supportive involved family.  d. Desire to seek treatment.     GOALS OF TREATMENT/OBJECTIVES OF CARE:  Goal is to monitor and treat dementia-related agitation, psychosis and mood, and assist with any discharge planning needs.    DSM-IV DIAGNOSES:    AXIS I:  Major depression, single episode, moderate, without psychosis; as well as Alzheimer dementia with behavioral disturbances.  AXIS II:  Deferred.  AXIS III:  Please refer to past  medical history.  AXIS IV:  Cognitive impairment.  AXIS V:  Global Assessment of Functioning equals 25.      RECOMMENDATIONS:  At this time, patient lacks the capacity to make all medical and financial decisions, as he does not understand the risks and benefits as well as consequences of his decisions.  Defer to power of  for all consents.  Recommend skilled rehab placement post discharge for ongoing therapy.  Due to poor appetite and mood instability, would start Remeron 7.5 mg at bedtime.  Discontinue Aricept due to poor appetite, as this is a known side effect of this medication and may be contributing to some of his behaviors at night.  Okay to continue with Seroquel, but we are hopeful that the Remeron may be effective in helping with nocturnal restlessness.  If so, then the Seroquel may be something that we can discontinue.  Case discussed with wife at bedside.  Chart reviewed.  No need for inpatient psychiatric care, and the patient is safe to discharge to skilled rehab once placement has been secured.  Approximately 35 minutes total time spent on case with greater than 50% of the time spent on counseling and coordination of care.  We will continue to follow.  Please call with questions.    Dictated By Eitan Cloud DO  d: 05/05/2025 13:59:24  t: 05/05/2025 14:23:46  Job [id number]  /

## 2025-05-05 NOTE — PROGRESS NOTES
[facility] Hospitalist Progress Note       SUBJECTIVE:  Pt awake and alert  States he didn't sleep well     OBJECTIVE:  Scheduled Meds: Scheduled Medications[1]  Continuous Infusions: Medication Infusions[2]  PRN Meds: PRN Medications[3]    Vitals  @3VITALS@     Exam   Gen-    no acute distress, alert and oriented x 3   RESP-   Lungs CTA, normal respiratory effort  CV-      Heart RRR, no mgr  Abd-    soft, nondistended, nontender, bowel sounds present  Skin-    no rash  Neuro-  no focal neurologic deficits  Ext-      No edema in extremities   Psych- alert and oriented x 3     Labs:     Recent Labs   Lab 04/30/25  1322 05/01/25  1842 05/02/25  0647 05/03/25  1436 05/04/25  0702   WBC 8.3 10.1 6.1 6.7 7.0   HGB 9.0* 9.7* 8.1* 9.0* 9.0*   MCV 95.4 95.2 93.8 95.1 94.8   .0* 472.0* 412.0 445.0 461.0*       Recent Labs   Lab 04/30/25 1322 05/01/25  1842 05/02/25  0647 05/02/25  1604 05/03/25  1436 05/04/25  0722    139 140  --  137 140   K 3.5 3.3* 3.6  3.6 4.9 4.1 3.8    106 110  --  105 105   CO2 27.0 24.0 25.0  --  22.0 21.0   BUN 13 13 13  --  10 11   CREATSERUM 0.92 0.85 0.84  --  0.81 0.89   CA 8.5* 9.3 8.2*  --  8.8 8.6*   MG  --   --  1.9  --   --  1.9   GLU 93 94 98  --  83 64*       Recent Labs   Lab 04/30/25 1322 05/01/25  1842 05/02/25  0844 05/03/25  1436 05/04/25  0722   ALT 10 10 8* 8* 8*   AST 24 27 23 24 25   ALB 3.3 3.8 3.5 3.7 3.5       Recent Labs   Lab 05/04/25  0819 05/04/25 2054 05/05/25  0552   PGLU 73 124* 119*       AP:  Greg Majano JrBolivar is a(n) 80 year old male Dementia, eHTN, HLD who fell down the stairs in [place], was admitted to OSH, found to have right olecranon fracture, right rib fractures, bilateral unstable pelvic fractures w/ extraperitoneal hemorrhage, compression fractures at T4, T6 thought to be chronic, was seen by trauma and ortho, had ORIF of his right olecranon and pelvis. He had delirium, and required 1u pRBCs, he was taken back to his home in [place] by family  who now presents w/  falls, inability to ambulate.      [facility] fall   S/p bilateral unstable pelvic fractures s/p ORIF at OSH in [place]  Right olecranon fracture s/p ORIF   H/o compression fractures at T4, T6  Low back pains  Right rib fractures   Pulmonary contusion  - patient w/ repeated falls, likely 2/2 dementia, physical deconditioning, impulsiveness, etc   - lumbar spine XR w/o fracture, prior T spine fractures eval by nsgy at OSH, no plans for surgery vs kypho, ortho at OSH rec WBAT for b/l LE and NWB of RUE      - pain control w/ norco prn   - consult pt/ot   - op ortho follow up      Minimally elevated bilirubin  - trend  - if LFTs are stable- improved today     Dementia   Agitated delirium   - cont home therapies   - seroquel nightly added  - qtc 460  -  psych consult for decision making capacity as patient is requesting to dc home, not currently medically safe, also should dc to rehab given multiple falls and serious fractures      eHTN  HLD  - lipitor   - resume home losartan      Anemia 2/2 abla from fractures and extraperitoneal bleed  - improved from prior, now down trending   - trend cbc daily   - restart aspirin, lovenox      Hypoglycemia  - 2/2 poor po intake  - iv thiamine, then switch iv fluids to d5 0.9 w/ 20mEq K at 50mL/hr      Dispo: pending labs this am and placement, given posey overnight will need to be restraint free x 24 hrs prior to dc          Vonda Wagner MD             [1]    thiamine  100 mg Intravenous Daily    QUEtiapine  25 mg Oral Nightly    enoxaparin  40 mg Subcutaneous Nightly    folic acid  1 mg Oral Daily    losartan  25 mg Oral Daily    lidocaine-menthol  1 patch Transdermal Daily    atorvastatin  10 mg Oral Nightly    donepezil  10 mg Oral Nightly   [2]    potassium chloride in dextrose 5%-sodium chloride 0.9% 50 mL/hr at 05/04/25 1103   [3]   glucose **OR** glucose **OR** glucose-vitamin C **OR** dextrose **OR** glucose **OR** glucose **OR** glucose-vitamin C     HYDROcodone-acetaminophen **OR** HYDROcodone-acetaminophen    trolamine salicyliate    LORazepam    acetaminophen

## 2025-05-05 NOTE — BH PROGRESS NOTE
Seen and examined. Full consult dictated. Pt lacks capacity to make all medical and financial decisions. Please defer to POA for all decisions. Will start remeron for mood/appetite. Stop aricept due to poor oral intake. Will follow. Agree with SNF placement.

## 2025-05-05 NOTE — PHYSICAL THERAPY NOTE
PHYSICAL THERAPY TREATMENT NOTE - INPATIENT    Room Number: 3617/3617-A     Session: 1     Number of Visits to Meet Established Goals: 5    Presenting Problem: Fall  Co-Morbidities : Dementia, anxiety, frequent falls  pt with recent. Bilateral pubic rami fractures, bilateral posterior medial iliac bone fractures, left sacral ala fracture, right SI joint diastases. Underwent percutaneous fixation of the right sacral and iliac fracture, left sacrum fracture, and a bilateral superior ramus fracture    PHYSICAL THERAPY ASSESSMENT   Patient demonstrates fair progress this session, goals  remain in progress.      Patient is requiring supervision as a result of the following impairments: cognitive deficits (impulsivity).     Patient continues to function below baseline with bed mobility.  Next session anticipate patient to progress transfers and gait.  Physical Therapy will continue to follow patient for duration of hospitalization.    Patient continues to benefit from continued skilled PT services: to promote return to prior level of function and safety with continuous assistance and gradual rehabilitative therapy .     PLAN DURING HOSPITALIZATION  Nursing Mobility Recommendation : 1 Assist  PT Device Recommendation: Wheelchair, LBQC  PT Treatment Plan: Bed mobility, Patient education, Family education, Gait training, Strengthening, Transfer training, Balance training  Frequency (Obs): 3-5x/week     CURRENT GOALS     Goal #1 Patient is able to demonstrate supine - sit EOB @ level: modified independent      Goal #2 Patient is able to demonstrate [place] [facility] [place] assistance level: supervision      Goal #3 Patient is able to ambulate 100 feet with assist device: cane - quad at assistance level: CGA      Goal #4 Work w/ patient on w/c mobiltiy as well.   Goal #5     Goal #6     Goal Comments: Goals established on 5/3/2025, no progress 5/5/25.        PHYSICAL THERAPY MEDICAL/SOCIAL HISTORY  History related to  current admission: Patient is a 80 year old male admitted on 5/1/2025 from home for a fall.  Pt diagnosed with recurrent fall after recent fall down stairs in [place] in April.  Pt w/ unstable pelvic fx s/p ORIF in [place], ORIF R olecranon as well.  Pt w/ thoracic and rib fractures w/ back pain.     HOME SITUATION  Type of Home: House  Home Layout: One level, Ramped entrance  Stairs to Enter : 0        Stairs to Bedroom: 0         Lives With: Spouse    Drives: No   Patient Regularly Uses: Rolling walker, Wheelchair       Prior Level of [place]: Prior to fall in [place], pt was supervision assist for adl's and gait w/ rw.    SUBJECTIVE  \"Will you just let me do this?!?\"    OBJECTIVE  Precautions: Bed/chair alarm, Limb alert - right    WEIGHT BEARING RESTRICTION  R Upper Extremity: Non-Weight Bearing  R Lower Extremity: Weight Bearing as Tolerated  L Lower Extremity: Weight Bearing as Tolerated    PAIN ASSESSMENT   Rating: Unable to rate  Location: does not report pain  Management Techniques: Repositioning    BALANCE                                                                                                                       Static Sitting: Good  Dynamic Sitting: Poor +           Static Standing: Poor +  Dynamic Standing: Poor +    ACTIVITY TOLERANCE                         O2 WALK       AM-PAC '6-Clicks' INPATIENT SHORT FORM - BASIC MOBILITY  How much difficulty does the patient currently have...  Patient Difficulty: Turning over in bed (including adjusting bedclothes, sheets and blankets)?: None   Patient Difficulty: Sitting down on and standing up from a chair with arms (e.g., wheelchair, bedside commode, etc.): A Little   Patient Difficulty: Moving from lying on back to sitting on the side of the bed?: A Little   How much help from another person does the patient currently need...   Help from Another: Moving to and from a bed to a chair (including a wheelchair)?: A Little   Help from Another: Need to walk in  hospital room?: A Little   Help from Another: Climbing 3-5 steps with a railing?: A Lot     AM-PAC Score:  Raw Score: 18   Approx Degree of Impairment: 46.58%   Standardized Score (AM-PAC Scale): 43.63   CMS Modifier (G-Code): CK    FUNCTIONAL ABILITY STATUS  Gait Assessment   Functional Mobility/Gait Assessment  Gait Assistance: Not tested  Distance (ft): 0  Assistive Device: None  Pattern: Shuffle    Skilled Therapy Provided    Bed Mobility:  Rolling: supervision   Supine<>Sit: supervision- impulsively demonstrating after becoming agitated with removal of covers.     Sit<>Supine: supervision         Therapist's Comments: Upon removing covers, pt sitting up to eob, scooting down to bottom to get around bed railing.  Pt repeatedly attempting to stand and becoming agitated with education on need for gait belt.  Pt unwilling to allow writer to place, encouraged to return to bed.  Pt attempting to stand one more time prior to then quickly getting self into bed and scooting up on own.  Pt not maintaining NWB of RUE.      RN present for session, family member present but asleep on couch.        Patient End of Session: In bed, Needs met, Call light within reach, RN aware of session/findings, All patient questions and concerns addressed, Hospital anti-slip socks, Family present    PT Session Time: 8 minutes    Therapeutic Activity: 8 minutes

## 2025-05-05 NOTE — PLAN OF CARE
Received pt at 2130. Pt is A&O x 1; follows commands, but impulsive and needing redirection often. Pt is on RA, VSS, reg diet with QID Accuchecks. Pt is incontinent of bowel and bladder with primofit system in place. Pt's pain is managed with PRN medication and ambulates with blanca steady and stand 7 pivot. IV access is patent infusing D5 0.9 with 20 meq K+ @ 50ml/hr. Shift assessment performed, HS meds given. NOC safety plan in place; bed in low position, family at bedside, bed alarm on,  call light and personal items within reach, pt encouraged to call staff for assistance.    POC:  [facility] placement  Psych consult

## 2025-05-05 NOTE — PLAN OF CARE
Assumed care of patient at 0730. Pt on RA SR on tele. Wife at bedside. Pt from home, had multiple falls. Pt's family looking for SNF placement. Pt calm throughout day, was not aggravated or agitated. Pt and family updated on plan of care.     Problem: SAFETY ADULT - FALL  Goal: Free from fall injury  Description: INTERVENTIONS:- Assess pt frequently for physical needs- Identify cognitive and physical deficits and behaviors that affect risk of falls.- [place] fall precautions as indicated by assessment.- Educate pt/family on patient safety including physical limitations- Instruct pt to call for assistance with activity based on assessment- Modify environment to reduce risk of injury- Provide assistive devices as appropriate- Consider OT/PT consult to assist with strengthening/mobility- Encourage toileting schedule  Outcome: Progressing

## 2025-05-06 LAB
ANION GAP SERPL CALC-SCNC: 8 MMOL/L (ref 0–18)
BASOPHILS # BLD AUTO: 0.02 X10(3) UL (ref 0–0.2)
BASOPHILS NFR BLD AUTO: 0.4 %
BUN BLD-MCNC: 7 MG/DL (ref 9–23)
CALCIUM BLD-MCNC: 8.9 MG/DL (ref 8.7–10.6)
CHLORIDE SERPL-SCNC: 107 MMOL/L (ref 98–112)
CO2 SERPL-SCNC: 26 MMOL/L (ref 21–32)
CREAT BLD-MCNC: 0.86 MG/DL (ref 0.7–1.3)
EGFRCR SERPLBLD CKD-EPI 2021: 88 ML/MIN/1.73M2 (ref 60–?)
EOSINOPHIL # BLD AUTO: 0.06 X10(3) UL (ref 0–0.7)
EOSINOPHIL NFR BLD AUTO: 1.1 %
ERYTHROCYTE [DISTWIDTH] IN BLOOD BY AUTOMATED COUNT: 17 %
GLUCOSE BLD-MCNC: 104 MG/DL (ref 70–99)
GLUCOSE BLD-MCNC: 110 MG/DL (ref 70–99)
GLUCOSE BLD-MCNC: 94 MG/DL (ref 70–99)
GLUCOSE BLD-MCNC: 95 MG/DL (ref 70–99)
GLUCOSE BLD-MCNC: 95 MG/DL (ref 70–99)
HCT VFR BLD AUTO: 28.9 % (ref 39–53)
HGB BLD-MCNC: 9.7 G/DL (ref 13–17.5)
IMM GRANULOCYTES # BLD AUTO: 0.02 X10(3) UL (ref 0–1)
IMM GRANULOCYTES NFR BLD: 0.4 %
LYMPHOCYTES # BLD AUTO: 0.99 X10(3) UL (ref 1–4)
LYMPHOCYTES NFR BLD AUTO: 18.5 %
MAGNESIUM SERPL-MCNC: 1.9 MG/DL (ref 1.6–2.6)
MCH RBC QN AUTO: 31.7 PG (ref 26–34)
MCHC RBC AUTO-ENTMCNC: 33.6 G/DL (ref 31–37)
MCV RBC AUTO: 94.4 FL (ref 80–100)
MONOCYTES # BLD AUTO: 0.66 X10(3) UL (ref 0.1–1)
MONOCYTES NFR BLD AUTO: 12.3 %
NEUTROPHILS # BLD AUTO: 3.61 X10 (3) UL (ref 1.5–7.7)
NEUTROPHILS # BLD AUTO: 3.61 X10(3) UL (ref 1.5–7.7)
NEUTROPHILS NFR BLD AUTO: 67.3 %
OSMOLALITY SERPL CALC.SUM OF ELEC: 290 MOSM/KG (ref 275–295)
PLATELET # BLD AUTO: 408 10(3)UL (ref 150–450)
POTASSIUM SERPL-SCNC: 3.8 MMOL/L (ref 3.5–5.1)
RBC # BLD AUTO: 3.06 X10(6)UL (ref 3.8–5.8)
SODIUM SERPL-SCNC: 141 MMOL/L (ref 136–145)
WBC # BLD AUTO: 5.4 X10(3) UL (ref 4–11)

## 2025-05-06 PROCEDURE — 99232 SBSQ HOSP IP/OBS MODERATE 35: CPT | Performed by: OTHER

## 2025-05-06 RX ORDER — POTASSIUM CHLORIDE 1500 MG/1
40 TABLET, EXTENDED RELEASE ORAL ONCE
Status: COMPLETED | OUTPATIENT
Start: 2025-05-06 | End: 2025-05-06

## 2025-05-06 NOTE — PROGRESS NOTES
[facility] Hospitalist Progress Note       SUBJECTIVE:  Feels sore   Upset about going to [facility]     OBJECTIVE:  Scheduled Meds: Scheduled Medications[1]  Continuous Infusions: Medication Infusions[2]  PRN Meds: PRN Medications[3]      Vitals:    05/06/25 0737   BP: 146/72   Pulse: 64   Resp: 18   Temp: 98.2 °F (36.8 °C)         Exam   Gen-    no acute distress,    RESP-   Lungs CTA, normal respiratory effort  CV-      Heart RRR, no mgr  Abd-    soft, nondistended, nontender, bowel sounds present  Skin-    no rash          Labs:     Recent Labs   Lab 05/02/25  0647 05/03/25  1436 05/04/25  0702 05/05/25  0633 05/06/25  0751   WBC 6.1 6.7 7.0 4.2 5.4   HGB 8.1* 9.0* 9.0* 9.3* 9.7*   MCV 93.8 95.1 94.8 95.9 94.4   .0 445.0 461.0* 408.0 408.0       Recent Labs   Lab 05/02/25  0647 05/02/25  1604 05/03/25  1436 05/04/25  0722 05/05/25  0633 05/06/25  0808     --  137 140 139 141   K 3.6  3.6 4.9 4.1 3.8 4.1  4.1 3.8     --  105 105 111 107   CO2 25.0  --  22.0 21.0 21.0 26.0   BUN 13  --  10 11 7* 7*   CREATSERUM 0.84  --  0.81 0.89 0.82 0.86   CA 8.2*  --  8.8 8.6* 8.6* 8.9   MG 1.9  --   --  1.9 1.8 1.9   GLU 98  --  83 64* 97 94       Recent Labs   Lab 05/01/25  1842 05/02/25  0844 05/03/25  1436 05/04/25  0722 05/05/25  0633   ALT 10 8* 8* 8* 9*   AST 27 23 24 25 25   ALB 3.8 3.5 3.7 3.5 3.3       Recent Labs   Lab 05/05/25  0552 05/05/25  1140 05/05/25  1619 05/05/25  2111 05/06/25  0509   PGLU 119* 112* 132* 108* 104*       AP:   80 year old male Dementia, eHTN, HLD who fell down the stairs in [place], was admitted to OSH, found to have right olecranon fracture, right rib fractures, bilateral unstable pelvic fractures w/ extraperitoneal hemorrhage, compression fractures at T4, T6 thought to be chronic, was seen by trauma and ortho, had ORIF of his right olecranon and pelvis. He had delirium, and required 1u pRBCs, he was taken back to his home in [place] by family who now presents w/  falls, inability to  ambulate.      [facility] fall   S/p bilateral unstable pelvic fractures s/p ORIF at OSH in [place]  Right olecranon fracture s/p ORIF   H/o compression fractures at T4, T6  Low back pains  Right rib fractures   Pulmonary contusion  - patient w/ repeated falls, likely 2/2 dementia, physical deconditioning, impulsiveness, etc   - lumbar spine XR w/o fracture, prior T spine fractures eval by nsgy at OSH, no plans for surgery vs kypho, ortho at OSH rec WBAT for b/l LE and NWB of RUE      - pain control w/ norco prn   - consult pt/ot - [facility]  - op ortho follow up      Minimally elevated bilirubin  - trend  - if LFTs are stable- improved      Dementia   Agitated delirium   - cont home therapies   - seroquel nightly added  - qtc 460  -  psych saw pt - pt not able to make decisions      eHTN  HLD  - lipitor   - resume home losartan      Anemia 2/2 abla from fractures and extraperitoneal bleed  - improved from prior      Hypoglycemia  - 2/2 poor po intake  - iv thiamine, then switch iv fluids to d5 0.9 w/ 20mEq K at 50mL/hr      Dispo: pending  placement          Vonda Wagner MD           [1]    potassium chloride  40 mEq Oral Once    mirtazapine  7.5 mg Oral Nightly    thiamine  100 mg Oral Daily    QUEtiapine  25 mg Oral Nightly    enoxaparin  40 mg Subcutaneous Nightly    folic acid  1 mg Oral Daily    losartan  25 mg Oral Daily    lidocaine-menthol  1 patch Transdermal Daily    atorvastatin  10 mg Oral Nightly   [2] [3]   glucose **OR** glucose **OR** glucose-vitamin C **OR** dextrose **OR** glucose **OR** glucose **OR** glucose-vitamin C    HYDROcodone-acetaminophen **OR** HYDROcodone-acetaminophen    trolamine salicyliate    LORazepam    acetaminophen

## 2025-05-06 NOTE — PLAN OF CARE
Problem: Patient/Family Goals  Goal: Patient/Family Long Term Goal  Description: Patient's Long Term Goal: discharge Interventions:- coordinate with care team when appropriated for a safe discharge - See additional Care Plan goals for specific interventions  Outcome: Progressing  Goal: Patient/Family Short Term Goal  Description: Patient's Short Term Goal: increaseInterventions: -Achieve highest/safest level of independence in self care   - Assess ability and encourage patient to participate in ADLs to maximize function- Promote sitting position while performing ADLs such as feeding, grooming, and bathing- Educate and encourage patient/family in tolerated functional activity level and precautions during self-care  - See additional Care Plan goals for specific interventions  Outcome: Progressing     Problem: NEUROLOGICAL - ADULT  Goal: Achieves stable or improved neurological status  Description: INTERVENTIONS- Assess for and report changes in neurological status- Initiate measures to prevent increased intracranial pressure- Maintain blood pressure and fluid volume within ordered parameters to optimize cerebral perfusion and minimize risk of hemorrhage- Monitor temperature, glucose, and sodium. Initiate appropriate interventions as ordered  Outcome: Progressing     Problem: PAIN - ADULT  Goal: Verbalizes/displays adequate comfort level or patient's stated pain goal  Description: INTERVENTIONS:- Encourage pt to monitor pain and request assistance- Assess pain using appropriate pain scale- Administer analgesics based on type and severity of pain and evaluate response- Implement non-pharmacological measures as appropriate and evaluate response- Consider cultural and social influences on pain and pain management- Manage/alleviate anxiety- Utilize distraction and/or relaxation techniques- Monitor for opioid side effects- Notify MD/LIP if interventions unsuccessful or patient reports new pain- Anticipate increased pain  with activity and pre-medicate as appropriate  Outcome: Progressing     Problem: SAFETY ADULT - FALL  Goal: Free from fall injury  Description: INTERVENTIONS:- Assess pt frequently for physical needs- Identify cognitive and physical deficits and behaviors that affect risk of falls.- [place] fall precautions as indicated by assessment.- Educate pt/family on patient safety including physical limitations- Instruct pt to call for assistance with activity based on assessment- Modify environment to reduce risk of injury- Provide assistive devices as appropriate- Consider OT/PT consult to assist with strengthening/mobility- Encourage toileting schedule  Outcome: Progressing     Problem: Safety Risk - Non-Violent Restraints  Goal: Patient will remain free from self-harm  Description: INTERVENTIONS:- Apply the least restrictive restraint to prevent harm- Notify patient and family of reasons restraints applied- Assess for any contributing factors to confusion (electrolyte disturbances, delirium, medications)- Discontinue any unnecessary medical devices as soon as possible- Assess the patient's physical comfort, circulation, skin condition, hydration, nutrition and elimination needs - Reorient and redirection as needed- Assess for the need to continue restraints  Outcome: Progressing   Received pt at 0200. Pt slept well overnight. Wife at bedside. All needs met at this time.

## 2025-05-06 NOTE — PLAN OF CARE
Assumed patient care at 1930.  AxOx3 - pt confused at times but follows commands and calm, able to reorient patient as needed.  RA  VSS  NSR on tele  PRN pain medication given for LUE pain  Lidocaine patch to left upper back  Denies nausea  Spouse at bedside  Up x1-2 w/blanca steady  Purewick in place for incontinence  LFA PIV w/IVF infusing per MAR  Right arm precautions for hx of right elbow fx - pt denies pain to arm  Regular diet - encouraging PO intake  QID Accuchecks  Bed in lowest position  Call light within reach  Bed alarm on  Needs met at this time    0057 - IVF discontinued per MAR order.  0200 - report given to oncoming RN.    Problem: Patient/Family Goals  Goal: Patient/Family Long Term Goal  Description: Patient's Long Term Goal: discharge Interventions:- coordinate with care team when appropriated for a safe discharge - See additional Care Plan goals for specific interventions  Outcome: Progressing  Goal: Patient/Family Short Term Goal  Description: Patient's Short Term Goal: increaseInterventions: -Achieve highest/safest level of independence in self care   - Assess ability and encourage patient to participate in ADLs to maximize function- Promote sitting position while performing ADLs such as feeding, grooming, and bathing- Educate and encourage patient/family in tolerated functional activity level and precautions during self-care  - See additional Care Plan goals for specific interventions  Outcome: Progressing     Problem: NEUROLOGICAL - ADULT  Goal: Achieves stable or improved neurological status  Description: INTERVENTIONS- Assess for and report changes in neurological status- Initiate measures to prevent increased intracranial pressure- Maintain blood pressure and fluid volume within ordered parameters to optimize cerebral perfusion and minimize risk of hemorrhage- Monitor temperature, glucose, and sodium. Initiate appropriate interventions as ordered  Outcome: Progressing     Problem: PAIN -  ADULT  Goal: Verbalizes/displays adequate comfort level or patient's stated pain goal  Description: INTERVENTIONS:- Encourage pt to monitor pain and request assistance- Assess pain using appropriate pain scale- Administer analgesics based on type and severity of pain and evaluate response- Implement non-pharmacological measures as appropriate and evaluate response- Consider cultural and social influences on pain and pain management- Manage/alleviate anxiety- Utilize distraction and/or relaxation techniques- Monitor for opioid side effects- Notify MD/LIP if interventions unsuccessful or patient reports new pain- Anticipate increased pain with activity and pre-medicate as appropriate  Outcome: Progressing     Problem: SAFETY ADULT - FALL  Goal: Free from fall injury  Description: INTERVENTIONS:- Assess pt frequently for physical needs- Identify cognitive and physical deficits and behaviors that affect risk of falls.- [place] fall precautions as indicated by assessment.- Educate pt/family on patient safety including physical limitations- Instruct pt to call for assistance with activity based on assessment- Modify environment to reduce risk of injury- Provide assistive devices as appropriate- Consider OT/PT consult to assist with strengthening/mobility- Encourage toileting schedule  Outcome: Progressing     Problem: Safety Risk - Non-Violent Restraints  Goal: Patient will remain free from self-harm  Description: INTERVENTIONS:- Apply the least restrictive restraint to prevent harm- Notify patient and family of reasons restraints applied- Assess for any contributing factors to confusion (electrolyte disturbances, delirium, medications)- Discontinue any unnecessary medical devices as soon as possible- Assess the patient's physical comfort, circulation, skin condition, hydration, nutrition and elimination needs - Reorient and redirection as needed- Assess for the need to continue restraints  Outcome: Progressing

## 2025-05-06 NOTE — BH PROGRESS NOTE
5/6/2025    Seen in person at [facility]    Interval Chief Complaint: Follow-up on depression and dementia    Subjective:  Wife at bedside.  Patient denies any new concerns and is calmer today but is eager to leave the hospital.  He still remains resistant to going to skilled rehab but wife is already decided that he has been accepted at a facility in [place] near the home and is expected to discharge today.  Patient not suicidal or homicidal.  He appears much calmer today and is much less tearful and labile today compared yesterday.  His appetite still remains poor but advised wife to respect this to get better with time.  I also educated wife on the side effect profile of Aricept and she was unaware that this can cause poor appetite and weight loss and she states that the poor appetite started after starting this medication but is unclear if there is a direct correlation.  She is agreeable to him being off the medication and monitoring how he does.  I also reminded her of the positive benefits that Remeron can help on his oral intake as well as sleep and she is amenable to him continue with this and thus far he has no side effects of the medication.  He remains alert and oriented to self only with impaired recall.  I supported the decision to go to rehab and encourage patient to accept this recommendation as it would allow him to be in the best position to be successful and ultimately he was more open to the idea by the end of our conversation.  No current hallucinations or paranoia and no physical aggression reported.    MEDS: Current Medications[1]     Vitals:    05/06/25 1129   BP: 160/72   Pulse: 64   Resp: 18   Temp: 98 °F (36.7 °C)        ROS:   As above. Otherwise negative on 14 system exam.    MSE:   Conscious/Orientation: A + O x person only   Appearance: good grooming  Behavior: no psychomotor abnormalities, good eye contact and engagement with interview.  Speech: normal rate, rhythm, and  volume  Mood: \"ok\"  Affect: congruent, calmer, reactive   Thought process: linear, logical, goal directed  Thought content:   +delusions regarding time and place  Suicidal ideation: denies  Homicidal ideation: denies  Perceptions: no hallucinations  Insight: poor  Judgment: fair  Loosening of associations:    Mdd, moderate, single episode, Alzheimer's dementia with behavioral disturbances-fair     Plan:  Continue medications as ordered  Okay to discharge to skilled rehab today  Educated wife on the side effect profile associated with cholinesterase inhibitors and the risk of poor appetite.  Monitor off donepezil for now  Continue Remeron as ordered  Case discussed with nursing staff as well as family at bedside  Approximately 35 minutes total time spent in case of greater than 50% time spent in counseling coordination of care  We will sign off    Eitan Cloud DO  Board Certified Adult and Geriatric Psychiatrist  Medical Director, Geriatric Psychiatry, [facility]   Medical Director, Psychiatric Consultation Service, [facility]               [1]    [COMPLETED] potassium chloride (Klor-Con M20) tab 40 mEq  40 mEq Oral Once    [COMPLETED] magnesium oxide (Mag-Ox) tab 400 mg  400 mg Oral Once    mirtazapine (Remeron) tab 7.5 mg  7.5 mg Oral Nightly    thiamine (Vitamin B1) tab 100 mg  100 mg Oral Daily    [COMPLETED] potassium chloride (Klor-Con M20) tab 40 mEq  40 mEq Oral Once    [] potassium chloride 20 mEq in dextrose 5%-sodium chloride 0.9% 1000mL infusion premix   Intravenous Continuous    glucose (Dex4) 15 GM/59ML oral liquid 15 g  15 g Oral Q15 Min PRN    Or    glucose (Glutose) 40% oral gel 15 g  15 g Oral Q15 Min PRN    Or    glucose-vitamin C (Dex-4) chewable tab 4 tablet  4 tablet Oral Q15 Min PRN    Or    dextrose 50% injection 50 mL  50 mL Intravenous Q15 Min PRN    Or    glucose (Dex4) 15 GM/59ML oral liquid 30 g  30 g Oral Q15 Min PRN    Or    glucose (Glutose) 40% oral gel 30  g  30 g Oral Q15 Min PRN    Or    glucose-vitamin C (Dex-4) chewable tab 8 tablet  8 tablet Oral Q15 Min PRN    QUEtiapine (SEROquel) tab 25 mg  25 mg Oral Nightly    HYDROcodone-acetaminophen (Norco) 5-325 MG per tab 1 tablet  1 tablet Oral Q4H PRN    Or    HYDROcodone-acetaminophen (Norco) 5-325 MG per tab 2 tablet  2 tablet Oral Q4H PRN    enoxaparin (Lovenox) 40 MG/0.4ML SUBQ injection 40 mg  40 mg Subcutaneous Nightly    [COMPLETED] potassium chloride (Klor-Con) 20 MEQ oral powder 40 mEq  40 mEq Oral Q4H    folic acid (Folvite) tab 1 mg  1 mg Oral Daily    losartan (Cozaar) tab 25 mg  25 mg Oral Daily    lidocaine-menthol 4-1 % patch 1 patch  1 patch Transdermal Daily    trolamine salicyliate 10 % cream   Topical TID PRN    [COMPLETED] LORazepam (Ativan) 2 mg/mL injection 0.5 mg  0.5 mg Intravenous Once    [] HYDROmorphone (Dilaudid) 1 MG/ML injection 0.5 mg  0.5 mg Intravenous Q30 Min PRN    [] ondansetron (Zofran) 4 MG/2ML injection 4 mg  4 mg Intravenous Q4H PRN    LORazepam (Ativan) 2 mg/mL injection 0.5 mg  0.5 mg Intravenous Q4H PRN    [COMPLETED] HYDROcodone-acetaminophen (Norco) 5-325 MG per tab 1 tablet  1 tablet Oral Once    [COMPLETED] acetaminophen (Tylenol Extra Strength) tab 500 mg  500 mg Oral Once    atorvastatin (Lipitor) tab 10 mg  10 mg Oral Nightly    [COMPLETED] sodium chloride 0.9% infusion   Intravenous Once    acetaminophen (Tylenol Extra Strength) tab 500 mg  500 mg Oral Q4H PRN    [COMPLETED] potassium chloride 40 mEq in 250mL sodium chloride 0.9% IVPB premix  40 mEq Intravenous Once

## 2025-05-06 NOTE — CM/SW NOTE
KATHY received message from Tomasa at [facility] stating she received insurance authorization for [facility]. KATHY inquired on bed availability at [facility] today.     Await medical clearance for further discharge planning. KATHY will continue to follow.    RENETTA Cani  Discharge Planner

## 2025-05-07 VITALS
TEMPERATURE: 98 F | SYSTOLIC BLOOD PRESSURE: 156 MMHG | HEIGHT: 69 IN | RESPIRATION RATE: 18 BRPM | DIASTOLIC BLOOD PRESSURE: 69 MMHG | WEIGHT: 154.13 LBS | HEART RATE: 65 BPM | OXYGEN SATURATION: 100 % | BODY MASS INDEX: 22.83 KG/M2

## 2025-05-07 LAB
ANION GAP SERPL CALC-SCNC: 10 MMOL/L (ref 0–18)
BASOPHILS # BLD AUTO: 0.02 X10(3) UL (ref 0–0.2)
BASOPHILS NFR BLD AUTO: 0.5 %
BUN BLD-MCNC: 8 MG/DL (ref 9–23)
CALCIUM BLD-MCNC: 8.7 MG/DL (ref 8.7–10.6)
CHLORIDE SERPL-SCNC: 107 MMOL/L (ref 98–112)
CO2 SERPL-SCNC: 23 MMOL/L (ref 21–32)
CREAT BLD-MCNC: 0.84 MG/DL (ref 0.7–1.3)
EGFRCR SERPLBLD CKD-EPI 2021: 88 ML/MIN/1.73M2 (ref 60–?)
EOSINOPHIL # BLD AUTO: 0.04 X10(3) UL (ref 0–0.7)
EOSINOPHIL NFR BLD AUTO: 0.9 %
ERYTHROCYTE [DISTWIDTH] IN BLOOD BY AUTOMATED COUNT: 16.9 %
GLUCOSE BLD-MCNC: 115 MG/DL (ref 70–99)
GLUCOSE BLD-MCNC: 132 MG/DL (ref 70–99)
HCT VFR BLD AUTO: 28.1 % (ref 39–53)
HGB BLD-MCNC: 9.5 G/DL (ref 13–17.5)
IMM GRANULOCYTES # BLD AUTO: 0.01 X10(3) UL (ref 0–1)
IMM GRANULOCYTES NFR BLD: 0.2 %
LYMPHOCYTES # BLD AUTO: 0.95 X10(3) UL (ref 1–4)
LYMPHOCYTES NFR BLD AUTO: 21.7 %
MAGNESIUM SERPL-MCNC: 1.9 MG/DL (ref 1.6–2.6)
MCH RBC QN AUTO: 32.1 PG (ref 26–34)
MCHC RBC AUTO-ENTMCNC: 33.8 G/DL (ref 31–37)
MCV RBC AUTO: 94.9 FL (ref 80–100)
MONOCYTES # BLD AUTO: 0.54 X10(3) UL (ref 0.1–1)
MONOCYTES NFR BLD AUTO: 12.4 %
NEUTROPHILS # BLD AUTO: 2.81 X10 (3) UL (ref 1.5–7.7)
NEUTROPHILS # BLD AUTO: 2.81 X10(3) UL (ref 1.5–7.7)
NEUTROPHILS NFR BLD AUTO: 64.3 %
OSMOLALITY SERPL CALC.SUM OF ELEC: 289 MOSM/KG (ref 275–295)
PLATELET # BLD AUTO: 376 10(3)UL (ref 150–450)
POTASSIUM SERPL-SCNC: 3.8 MMOL/L (ref 3.5–5.1)
POTASSIUM SERPL-SCNC: 3.8 MMOL/L (ref 3.5–5.1)
RBC # BLD AUTO: 2.96 X10(6)UL (ref 3.8–5.8)
SODIUM SERPL-SCNC: 140 MMOL/L (ref 136–145)
WBC # BLD AUTO: 4.4 X10(3) UL (ref 4–11)

## 2025-05-07 RX ORDER — MIRTAZAPINE 7.5 MG/1
7.5 TABLET, FILM COATED ORAL NIGHTLY
Qty: 30 TABLET | Refills: 0 | Status: SHIPPED
Start: 2025-05-07

## 2025-05-07 RX ORDER — QUETIAPINE FUMARATE 25 MG/1
25 TABLET, FILM COATED ORAL NIGHTLY
Qty: 30 TABLET | Refills: 0 | Status: SHIPPED
Start: 2025-05-07

## 2025-05-07 NOTE — PLAN OF CARE
Assumed care of pt at 0730 hr- pt is alert, confused, endorses right arm pain, on RA, continuous cardiac and Spo2 monitoring is in place, PIV is SL, RUE in bulky surgcial dressing, bedrest until therapy reccs placed. . Safety precautions are in place.    Wife at bedside, plan of care discussed.    Ortho consult placed, per ortho remove existing Sx dressing and place RUE in arm sling.    RUE in arm sling, NWB orders in effect.

## 2025-05-07 NOTE — PROGRESS NOTES
NURSING DISCHARGE NOTE    Discharged Nursing home via Ambulance.  Accompanied by Spouse  Belongings Taken by patient/family  AVS completed and reviewed with patients  Peripheral IV removed   All questions answered   Called Bharati for report .

## 2025-05-07 NOTE — PLAN OF CARE
Assumed care at 1930. Pt is alert and oriented x1. No c/o of pain. Room Air . NSR on tele, No weight bearing on RUE, sling on RUE. QID accucheck, regular diet, poor intake. LFA PIV with SL.  Wife at bedside. Purewick is in place. Safety precaution is in place.  All needs met at this time.      Problem: Patient/Family Goals  Goal: Patient/Family Long Term Goal  Description: Patient's Long Term Goal: discharge Interventions:- coordinate with care team when appropriated for a safe discharge - See additional Care Plan goals for specific interventions  Outcome: Progressing  Goal: Patient/Family Short Term Goal  Description: Patient's Short Term Goal: increaseInterventions: -Achieve highest/safest level of independence in self care   - Assess ability and encourage patient to participate in ADLs to maximize function- Promote sitting position while performing ADLs such as feeding, grooming, and bathing- Educate and encourage patient/family in tolerated functional activity level and precautions during self-care  - See additional Care Plan goals for specific interventions  Outcome: Progressing     Problem: NEUROLOGICAL - ADULT  Goal: Achieves stable or improved neurological status  Description: INTERVENTIONS- Assess for and report changes in neurological status- Initiate measures to prevent increased intracranial pressure- Maintain blood pressure and fluid volume within ordered parameters to optimize cerebral perfusion and minimize risk of hemorrhage- Monitor temperature, glucose, and sodium. Initiate appropriate interventions as ordered  Outcome: Progressing     Problem: SAFETY ADULT - FALL  Goal: Free from fall injury  Description: INTERVENTIONS:- Assess pt frequently for physical needs- Identify cognitive and physical deficits and behaviors that affect risk of falls.- Rudyard fall precautions as indicated by assessment.- Educate pt/family on patient safety including physical limitations- Instruct pt to call for  assistance with activity based on assessment- Modify environment to reduce risk of injury- Provide assistive devices as appropriate- Consider OT/PT consult to assist with strengthening/mobility- Encourage toileting schedule  Outcome: Progressing

## 2025-05-07 NOTE — DISCHARGE SUMMARY
[facility] [place]    Patient ID  Greg Majano Jr.  [id number]  80 year old  1/1/1945  Admit date: 5/1/2025  Discharge date: 5/7/25  Attending: Vonda Wagner MD   Primary Care Physician: Les Bella MD   Reason for admission:  (see HPI on HP for further detail)  Discharge condition: fair  Disposition:  [facility]    Important follow up:  -PCP      Discharge med list     Discharge Medications        START taking these medications        Instructions Prescription details   lidocaine-menthol 4-1 % Ptch      Place 1 patch onto the skin daily.   Quantity: 30 patch  Refills: 0     mirtazapine 7.5 MG Tabs  Commonly known as: Remeron      Take 1 tablet (7.5 mg total) by mouth nightly.   Quantity: 30 tablet  Refills: 0     QUEtiapine 25 MG Tabs  Commonly known as: SEROquel      Take 1 tablet (25 mg total) by mouth nightly.   Quantity: 30 tablet  Refills: 0            CONTINUE taking these medications        Instructions Prescription details   aspirin 81 MG Chew      Chew 1 tablet (81 mg total) by mouth 2 (two) times daily.   Stop taking on: May 13, 2025  Refills: 0     atorvastatin 10 MG Tabs  Commonly known as: Lipitor      Take 1 tablet (10 mg total) by mouth daily.   Quantity: 90 tablet  Refills: 3     folic acid 1 MG Tabs  Commonly known as: Folvite      Take 1 tablet (1 mg total) by mouth in the morning.   Refills: 0     HYDROcodone-acetaminophen 5-325 MG Tabs  Commonly known as: Norco      Take 1 tablet by mouth every 4 (four) hours as needed for Pain. FOR PAIN   Refills: 0     losartan 25 MG Tabs  Commonly known as: Cozaar      TAKE 1 TABLET BY MOUTH EVERY DAY   Quantity: 90 tablet  Refills: 1     Sildenafil Citrate 50 MG Tabs  Commonly known as: Viagra      One po 30 min to 60 minutes prior to intercourse   Quantity: 8 tablet  Refills: 6     thiamine 100 MG Tabs  Commonly known as: Vitamin B1      Take 1 tablet (100 mg total) by mouth daily.   Quantity: 90 tablet  Refills: 0            STOP taking  these medications      ALPRAZolam 0.5 MG Tabs  Commonly known as: Xanax        donepezil 10 MG Tabs  Commonly known as: Aricept        Meloxicam 15 MG Tabs                  Where to Get Your Medications        Please  your prescriptions at the location directed by your doctor or nurse    Bring a paper prescription for each of these medications  lidocaine-menthol 4-1 % Ptch  mirtazapine 7.5 MG Tabs  QUEtiapine 25 MG Tabs           Discharge Diagnoses/Hospital course:   80 year old male Dementia, eHTN, HLD who fell down the stairs in [place], was admitted to OSH, found to have right olecranon fracture, right rib fractures, bilateral unstable pelvic fractures w/ extraperitoneal hemorrhage, compression fractures at T4, T6 thought to be chronic, was seen by trauma and ortho, had ORIF of his right olecranon and pelvis. He had delirium, and required 1u pRBCs, he was taken back to his home in [place] by family who now presents w/  falls, inability to ambulate.      [facility] fall   S/p bilateral unstable pelvic fractures s/p ORIF at OSH in [place]  Right olecranon fracture s/p ORIF   H/o compression fractures at T4, T6  Low back pains  Right rib fractures   Pulmonary contusion  - patient w/ repeated falls, likely 2/2 dementia, physical deconditioning, impulsiveness, etc   - lumbar spine XR w/o fracture, prior T spine fractures eval by nsgy at OSH, no plans for surgery vs kypho, ortho at OSH rec WBAT for b/l LE and NWB of RUE      - pain control w/ norco prn   - consult pt/ot - [facility]  - op ortho follow up  - keep sling on and RUE NWB, follow up with orhto      Minimally elevated bilirubin  - trend  - if LFTs are stable- improved      Dementia   Agitated delirium   - cont home therapies   - seroquel nightly added - per psych add remeron and wean off seroquel at facility   -          eHTN  HLD  - lipitor   - resume home losartan      Anemia 2/2 abla from fractures and extraperitoneal bleed  - improved from prior         Consults:  IP CONSULT TO HOSPITALIST  NURSING CONSULT TO DIETITIAN  IP CONSULT TO SOCIAL WORK  IP CONSULT TO PSYCHIATRY  IP CONSULT TO ORTHOPEDIC SURGERY    Radiology:  XR SHOULDER, COMPLETE (MIN 2 VIEWS), LEFT (CPT=73030)  Result Date: 5/3/2025  PROCEDURE:  XR SHOULDER, COMPLETE (MIN 2 VIEWS), LEFT (CPT=73030)  TECHNIQUE:  Multiple views were obtained.  COMPARISON:  None.  INDICATIONS:  Trauma and pain  PATIENT STATED HISTORY: (As transcribed by Technologist)  Patient states that the pain from his left shoulder radiates down to his elbow.    FINDINGS:  There is joint space narrowing of the glenohumeral joint.  There is no fracture.  Acromioclavicular joint is intact.  Soft tissues are unremarkable.            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  There is no acute fracture detected.   LOCATION:  [facility]   Dictated by (CST): Tanvir Spain MD on 5/03/2025 at 3:32 PM     Finalized by (CST): Tanvir Spain MD on 5/03/2025 at 3:33 PM       XR LUMBAR SPINE (MIN 2 VIEWS) (CPT=72100)  Result Date: 5/2/2025  PROCEDURE:  XR LUMBAR SPINE (MIN 2 VIEWS) (CPT=72100)  TECHNIQUE:  AP, lateral, and coned down L5-S1 views were obtained.  COMPARISON:  None.  INDICATIONS:  fall, low back pains  PATIENT STATED HISTORY: (As transcribed by Technologist)  Low back pain. Patient believes that he may have fallen 5 days ago while out of state with family but doesn't recall.    FINDINGS:    BONES:  Grade 1 anterolisthesis of L4 on L5 secondary to moderate facet joint degenerative change.  There is no fracture dislocation.  No lytic or blastic lesions.  Postsurgical changes of prior fusion across the SI joints with intramedullary angel within the pubic rami superiorly.  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 DISC SPACES:  There is mild to moderate degenerative disc disease at L5-S1 with disc space narrowing endplate osteophytes. PARASPINOUS:  Negative.  No paraspinous abnormality is seen. OTHER:  Negative.              CONCLUSION:   1.  Grade 1 anterolisthesis of L4 on L5 secondary to moderate facet joint degenerative change.   2. Mild degenerative disc disease at L5-S1.   LOCATION:  [facility]   Dictated by ([facility]): Carlos Collins MD on 5/02/2025 at 10:36 AM     Finalized by ([facility]): Carlos Collins MD on 5/02/2025 at 10:37 AM       XR ELBOW, (2 VIEWS), RIGHT (CPT=73070)  Result Date: 5/1/2025  PROCEDURE:  XR ELBOW, (2 VIEWS), RIGHT (CPT=73070)  INDICATIONS:  Multiple falls, last fall 2 days ago  COMPARISON:  None.  PATIENT STATED HISTORY: (As transcribed by Technologist)  Patient offered no additional history at this time.    FINDINGS:  Postsurgical changes are noted with plate and screw fixation of the proximal ulna.  Probable chronic avulsion fracture deformity noted along the dorsal aspect of the distal humerus.  Radial capitellar relationship appears within normal limits.  Elbow joint effusion.  Osteoarthritic changes.            CONCLUSION:  1. Postsurgical changes with plate and screw fixation of the proximal ulna. 2. There is an avulsion fracture fragment along the dorsal aspect of the distal humerus.  This may be chronic but correlation region of pain is recommended and if prior radiographs are available, comparison would be beneficial. 3. Joint effusion. 4. Osteoarthritic changes.    LOCATION:  [facility]   Dictated by ([facility]): Angelito Guido MD on 5/01/2025 at 7:23 PM     Finalized by ([facility]): Angelito Guido MD on 5/01/2025 at 7:26 PM       XR CHEST AP PORTABLE  (CPT=71045)  Result Date: 5/1/2025  PROCEDURE:  XR CHEST AP PORTABLE  (CPT=71045)  TECHNIQUE:  AP chest radiograph was obtained.  COMPARISON:  None.  INDICATIONS:  Multiple falls, last fall 2 days ago  PATIENT STATED HISTORY: (As transcribed by Technologist)  Patient offered no additional history at this time.    FINDINGS:  Cardiac size is within normal limits.  Mild interstitial opacities.  No pleural effusions.  No pneumothorax.  There are displaced posterior 7th and  8th rib fractures.  Osteoarthritic changes within the shoulders.            CONCLUSION:  1. Mildly displaced posterior 7th and 8th rib fractures. 2. Mild interstitial opacities may represent chronic interstitial changes versus mild edema.    LOCATION:  [facility]      Dictated by (CST): Angelito Guido MD on 5/01/2025 at 7:20 PM     Finalized by (CST): Angelito Guido MD on 5/01/2025 at 7:21 PM       XR PELVIS (COMPLETE MIN 3 VIEWS) (CPT=72190)  Result Date: 5/1/2025  PROCEDURE:  XR PELVIS (COMPLETE MIN 3 VIEWS) (CPT=72190)  TECHNIQUE:  Three views of the pelvis were obtained.  COMPARISON:  None.  INDICATIONS:  Multiple falls, last fall 2 days ago, pelvic pain  PATIENT STATED HISTORY: (As transcribed by Technologist)  Patient offered no additional history at this time.    FINDINGS:  There are multiple large orthopedic screws noted scattered in the pelvis.  There is age-indeterminate fracture deformities of the superior pubic rami adjacent to screws.  Comparison with prior exams would be helpful to assess for interval change.  Bilateral hip arthroplasty devices are noted without evidence of hardware complication.  Vascular calcifications.  Degenerative changes lower lumbar spine.  Osteopenia.            CONCLUSION:  There are multiple large orthopedic screws noted scattered in the pelvis.  There is age-indeterminate fracture deformities of the superior pubic rami adjacent to screws.  Comparison with prior exams would be helpful to assess for interval change.    LOCATION:  [facility]   Dictated by (CST): Ibrahima Starr MD on 5/01/2025 at 7:16 PM     Finalized by (CST): Ibrahima Starr MD on 5/01/2025 at 7:18 PM         Operative reports:          Day of discharge exam:  Vitals:    05/07/25 0724   BP: 156/69   Pulse: 65   Resp: 18   Temp: 98.1 °F (36.7 °C)     No acute distress,   Lungs clear  Heart regular  Abdomen benign     Patient and/or family had opportunity to ask questions and expressed understanding and agreement  with therapeutic plan as outlined      31  minutes spent on discharge    Vonda Wagner MD

## 2025-05-07 NOTE — CM/SW NOTE
05/07/25 1115   Discharge disposition   Expected discharge disposition subacute   Post Acute Care Provider [facility]   Discharge transportation [facility]     Notified by RN pt is medically cleared for discharge. Tomasa from [facility] stated there is a bed available at the facility.     [facility] arranged for 12:30pm. Updated RN and Tomasa at [facility]. RN to update pt/family. PCS form completed and available for RN to print.      [facility]  [phone]    [facility]  [phone]    RENETTA Cain  Discharge Planner